# Patient Record
Sex: FEMALE | Race: WHITE | NOT HISPANIC OR LATINO | Employment: OTHER | URBAN - METROPOLITAN AREA
[De-identification: names, ages, dates, MRNs, and addresses within clinical notes are randomized per-mention and may not be internally consistent; named-entity substitution may affect disease eponyms.]

---

## 2024-03-03 ENCOUNTER — ANESTHESIA EVENT (OUTPATIENT)
Dept: SURGERY | Facility: CLINIC | Age: 89
End: 2024-03-03
Payer: MEDICARE

## 2024-03-03 ENCOUNTER — HOSPITAL ENCOUNTER (OUTPATIENT)
Facility: CLINIC | Age: 89
Discharge: HOME | End: 2024-03-03
Attending: EMERGENCY MEDICINE | Admitting: EMERGENCY MEDICINE
Payer: MEDICARE

## 2024-03-03 ENCOUNTER — ANESTHESIA (OUTPATIENT)
Dept: SURGERY | Facility: CLINIC | Age: 89
End: 2024-03-03
Payer: MEDICARE

## 2024-03-03 ENCOUNTER — HOSPITAL ENCOUNTER (OUTPATIENT)
Facility: CLINIC | Age: 89
End: 2024-03-03
Attending: OPHTHALMOLOGY | Admitting: OPHTHALMOLOGY
Payer: MEDICARE

## 2024-03-03 VITALS
HEART RATE: 57 BPM | RESPIRATION RATE: 12 BRPM | OXYGEN SATURATION: 95 % | DIASTOLIC BLOOD PRESSURE: 52 MMHG | SYSTOLIC BLOOD PRESSURE: 139 MMHG | WEIGHT: 175.93 LBS | TEMPERATURE: 98.2 F

## 2024-03-03 DIAGNOSIS — H44.001 RIGHT ENDOPHTHALMIA: Primary | ICD-10-CM

## 2024-03-03 DIAGNOSIS — H40.9 ACUTE GLAUCOMA OF RIGHT EYE: ICD-10-CM

## 2024-03-03 PROCEDURE — 258N000003 HC RX IP 258 OP 636: Performed by: NURSE ANESTHETIST, CERTIFIED REGISTERED

## 2024-03-03 PROCEDURE — 370N000017 HC ANESTHESIA TECHNICAL FEE, PER MIN: Performed by: OPHTHALMOLOGY

## 2024-03-03 PROCEDURE — 250N000013 HC RX MED GY IP 250 OP 250 PS 637: Performed by: ANESTHESIOLOGY

## 2024-03-03 PROCEDURE — 250N000009 HC RX 250: Performed by: OPHTHALMOLOGY

## 2024-03-03 PROCEDURE — 99100 ANES PT EXTEME AGE<1 YR&>70: CPT | Performed by: NURSE ANESTHETIST, CERTIFIED REGISTERED

## 2024-03-03 PROCEDURE — 360N000077 HC SURGERY LEVEL 4, PER MIN: Performed by: OPHTHALMOLOGY

## 2024-03-03 PROCEDURE — 99285 EMERGENCY DEPT VISIT HI MDM: CPT | Mod: 25 | Performed by: EMERGENCY MEDICINE

## 2024-03-03 PROCEDURE — 710N000012 HC RECOVERY PHASE 2, PER MINUTE: Performed by: OPHTHALMOLOGY

## 2024-03-03 PROCEDURE — 99100 ANES PT EXTEME AGE<1 YR&>70: CPT | Performed by: ANESTHESIOLOGY

## 2024-03-03 PROCEDURE — 67036 REMOVAL OF INNER EYE FLUID: CPT | Mod: RT | Performed by: OPHTHALMOLOGY

## 2024-03-03 PROCEDURE — 710N000010 HC RECOVERY PHASE 1, LEVEL 2, PER MIN: Performed by: OPHTHALMOLOGY

## 2024-03-03 PROCEDURE — 250N000013 HC RX MED GY IP 250 OP 250 PS 637: Performed by: EMERGENCY MEDICINE

## 2024-03-03 PROCEDURE — 250N000013 HC RX MED GY IP 250 OP 250 PS 637

## 2024-03-03 PROCEDURE — 99285 EMERGENCY DEPT VISIT HI MDM: CPT | Performed by: EMERGENCY MEDICINE

## 2024-03-03 PROCEDURE — 87205 SMEAR GRAM STAIN: CPT | Performed by: OPHTHALMOLOGY

## 2024-03-03 PROCEDURE — 67036 REMOVAL OF INNER EYE FLUID: CPT | Performed by: ANESTHESIOLOGY

## 2024-03-03 PROCEDURE — 87070 CULTURE OTHR SPECIMN AEROBIC: CPT | Performed by: OPHTHALMOLOGY

## 2024-03-03 PROCEDURE — 250N000011 HC RX IP 250 OP 636: Performed by: OPHTHALMOLOGY

## 2024-03-03 PROCEDURE — 999N000141 HC STATISTIC PRE-PROCEDURE NURSING ASSESSMENT: Performed by: OPHTHALMOLOGY

## 2024-03-03 PROCEDURE — 87186 SC STD MICRODIL/AGAR DIL: CPT | Performed by: OPHTHALMOLOGY

## 2024-03-03 PROCEDURE — 250N000011 HC RX IP 250 OP 636: Performed by: NURSE ANESTHETIST, CERTIFIED REGISTERED

## 2024-03-03 PROCEDURE — 250N000025 HC SEVOFLURANE, PER MIN: Performed by: OPHTHALMOLOGY

## 2024-03-03 PROCEDURE — 250N000009 HC RX 250: Performed by: NURSE ANESTHETIST, CERTIFIED REGISTERED

## 2024-03-03 PROCEDURE — 67036 REMOVAL OF INNER EYE FLUID: CPT | Performed by: NURSE ANESTHETIST, CERTIFIED REGISTERED

## 2024-03-03 PROCEDURE — 87102 FUNGUS ISOLATION CULTURE: CPT | Performed by: OPHTHALMOLOGY

## 2024-03-03 PROCEDURE — 272N000001 HC OR GENERAL SUPPLY STERILE: Performed by: OPHTHALMOLOGY

## 2024-03-03 PROCEDURE — 87075 CULTR BACTERIA EXCEPT BLOOD: CPT | Performed by: OPHTHALMOLOGY

## 2024-03-03 PROCEDURE — 87015 SPECIMEN INFECT AGNT CONCNTJ: CPT | Performed by: OPHTHALMOLOGY

## 2024-03-03 RX ORDER — ONDANSETRON 4 MG/1
4 TABLET, ORALLY DISINTEGRATING ORAL EVERY 30 MIN PRN
Status: DISCONTINUED | OUTPATIENT
Start: 2024-03-03 | End: 2024-03-03 | Stop reason: HOSPADM

## 2024-03-03 RX ORDER — FENTANYL CITRATE 50 UG/ML
25 INJECTION, SOLUTION INTRAMUSCULAR; INTRAVENOUS EVERY 5 MIN PRN
Status: DISCONTINUED | OUTPATIENT
Start: 2024-03-03 | End: 2024-03-03 | Stop reason: HOSPADM

## 2024-03-03 RX ORDER — CARVEDILOL 25 MG/1
25 TABLET ORAL 2 TIMES DAILY WITH MEALS
Status: DISCONTINUED | OUTPATIENT
Start: 2024-03-03 | End: 2024-03-03 | Stop reason: HOSPADM

## 2024-03-03 RX ORDER — SPIRONOLACTONE 25 MG/1
25 TABLET ORAL
Status: DISCONTINUED | OUTPATIENT
Start: 2024-03-03 | End: 2024-03-03 | Stop reason: HOSPADM

## 2024-03-03 RX ORDER — OXYCODONE HYDROCHLORIDE 5 MG/1
5 TABLET ORAL
Status: DISCONTINUED | OUTPATIENT
Start: 2024-03-03 | End: 2024-03-03 | Stop reason: HOSPADM

## 2024-03-03 RX ORDER — FERROUS SULFATE 325(65) MG
325 TABLET ORAL
COMMUNITY

## 2024-03-03 RX ORDER — DEXAMETHASONE SODIUM PHOSPHATE 4 MG/ML
INJECTION, SOLUTION INTRA-ARTICULAR; INTRALESIONAL; INTRAMUSCULAR; INTRAVENOUS; SOFT TISSUE PRN
Status: DISCONTINUED | OUTPATIENT
Start: 2024-03-03 | End: 2024-03-03

## 2024-03-03 RX ORDER — MONTELUKAST SODIUM 4 MG/1
1 TABLET, CHEWABLE ORAL 2 TIMES DAILY
COMMUNITY
Start: 2023-09-14

## 2024-03-03 RX ORDER — OFLOXACIN 3 MG/ML
1 SOLUTION/ DROPS OPHTHALMIC 4 TIMES DAILY
Qty: 5 ML | Refills: 0 | Status: SHIPPED | OUTPATIENT
Start: 2024-03-03 | End: 2024-03-19

## 2024-03-03 RX ORDER — MULTIVIT WITH MINERALS/LUTEIN
250 TABLET ORAL DAILY
COMMUNITY

## 2024-03-03 RX ORDER — ONDANSETRON 2 MG/ML
4 INJECTION INTRAMUSCULAR; INTRAVENOUS EVERY 30 MIN PRN
Status: DISCONTINUED | OUTPATIENT
Start: 2024-03-03 | End: 2024-03-03 | Stop reason: HOSPADM

## 2024-03-03 RX ORDER — TRIAMCINOLONE ACETONIDE 1 MG/G
OINTMENT TOPICAL 2 TIMES DAILY
COMMUNITY
Start: 2024-02-27

## 2024-03-03 RX ORDER — LOSARTAN POTASSIUM 25 MG/1
25 TABLET ORAL DAILY
Status: DISCONTINUED | OUTPATIENT
Start: 2024-03-03 | End: 2024-03-03 | Stop reason: HOSPADM

## 2024-03-03 RX ORDER — ONDANSETRON 2 MG/ML
INJECTION INTRAMUSCULAR; INTRAVENOUS PRN
Status: DISCONTINUED | OUTPATIENT
Start: 2024-03-03 | End: 2024-03-03

## 2024-03-03 RX ORDER — HYDROMORPHONE HYDROCHLORIDE 1 MG/ML
0.4 INJECTION, SOLUTION INTRAMUSCULAR; INTRAVENOUS; SUBCUTANEOUS EVERY 5 MIN PRN
Status: DISCONTINUED | OUTPATIENT
Start: 2024-03-03 | End: 2024-03-03 | Stop reason: HOSPADM

## 2024-03-03 RX ORDER — DEXAMETHASONE SODIUM PHOSPHATE 4 MG/ML
4 INJECTION, SOLUTION INTRA-ARTICULAR; INTRALESIONAL; INTRAMUSCULAR; INTRAVENOUS; SOFT TISSUE
Status: DISCONTINUED | OUTPATIENT
Start: 2024-03-03 | End: 2024-03-03 | Stop reason: HOSPADM

## 2024-03-03 RX ORDER — ACETAZOLAMIDE 250 MG/1
500 TABLET ORAL ONCE
Status: COMPLETED | OUTPATIENT
Start: 2024-03-03 | End: 2024-03-03

## 2024-03-03 RX ORDER — SODIUM CHLORIDE, SODIUM LACTATE, POTASSIUM CHLORIDE, CALCIUM CHLORIDE 600; 310; 30; 20 MG/100ML; MG/100ML; MG/100ML; MG/100ML
INJECTION, SOLUTION INTRAVENOUS CONTINUOUS
Status: DISCONTINUED | OUTPATIENT
Start: 2024-03-03 | End: 2024-03-03 | Stop reason: HOSPADM

## 2024-03-03 RX ORDER — ACETAZOLAMIDE 250 MG/1
500 TABLET ORAL ONCE
Qty: 2 TABLET | Refills: 0 | Status: COMPLETED | OUTPATIENT
Start: 2024-03-03 | End: 2024-03-03

## 2024-03-03 RX ORDER — SODIUM CHLORIDE, SODIUM LACTATE, POTASSIUM CHLORIDE, CALCIUM CHLORIDE 600; 310; 30; 20 MG/100ML; MG/100ML; MG/100ML; MG/100ML
INJECTION, SOLUTION INTRAVENOUS CONTINUOUS PRN
Status: DISCONTINUED | OUTPATIENT
Start: 2024-03-03 | End: 2024-03-03

## 2024-03-03 RX ORDER — NALOXONE HYDROCHLORIDE 0.4 MG/ML
0.1 INJECTION, SOLUTION INTRAMUSCULAR; INTRAVENOUS; SUBCUTANEOUS
Status: DISCONTINUED | OUTPATIENT
Start: 2024-03-03 | End: 2024-03-03 | Stop reason: HOSPADM

## 2024-03-03 RX ORDER — LOSARTAN POTASSIUM 25 MG/1
25 TABLET ORAL DAILY
COMMUNITY
Start: 2023-05-22

## 2024-03-03 RX ORDER — FENTANYL CITRATE 50 UG/ML
INJECTION, SOLUTION INTRAMUSCULAR; INTRAVENOUS PRN
Status: DISCONTINUED | OUTPATIENT
Start: 2024-03-03 | End: 2024-03-03

## 2024-03-03 RX ORDER — ACETAMINOPHEN 325 MG/1
325-650 TABLET ORAL EVERY 6 HOURS PRN
COMMUNITY

## 2024-03-03 RX ORDER — FENTANYL CITRATE 50 UG/ML
25 INJECTION, SOLUTION INTRAMUSCULAR; INTRAVENOUS
Status: DISCONTINUED | OUTPATIENT
Start: 2024-03-03 | End: 2024-03-03 | Stop reason: HOSPADM

## 2024-03-03 RX ORDER — OXYCODONE HYDROCHLORIDE 10 MG/1
10 TABLET ORAL
Status: DISCONTINUED | OUTPATIENT
Start: 2024-03-03 | End: 2024-03-03 | Stop reason: HOSPADM

## 2024-03-03 RX ORDER — VIT A/VIT C/VIT E/ZINC/COPPER 4296-226
1 CAPSULE ORAL 2 TIMES DAILY
COMMUNITY

## 2024-03-03 RX ORDER — VITAMIN E (DL,TOCOPHERYL ACET) 90 MG
200 CAPSULE ORAL 2 TIMES DAILY
COMMUNITY

## 2024-03-03 RX ORDER — OXYCODONE HYDROCHLORIDE 5 MG/1
5 TABLET ORAL EVERY 4 HOURS PRN
Status: DISCONTINUED | OUTPATIENT
Start: 2024-03-03 | End: 2024-03-03 | Stop reason: HOSPADM

## 2024-03-03 RX ORDER — BALANCED SALT SOLUTION 6.4; .75; .48; .3; 3.9; 1.7 MG/ML; MG/ML; MG/ML; MG/ML; MG/ML; MG/ML
SOLUTION OPHTHALMIC PRN
Status: DISCONTINUED | OUTPATIENT
Start: 2024-03-03 | End: 2024-03-03 | Stop reason: HOSPADM

## 2024-03-03 RX ORDER — ACETAMINOPHEN 325 MG/1
975 TABLET ORAL ONCE
Status: COMPLETED | OUTPATIENT
Start: 2024-03-03 | End: 2024-03-03

## 2024-03-03 RX ORDER — PREDNISOLONE ACETATE 10 MG/ML
1 SUSPENSION/ DROPS OPHTHALMIC 4 TIMES DAILY
Qty: 5 ML | Refills: 0 | Status: SHIPPED | OUTPATIENT
Start: 2024-03-03 | End: 2024-03-19

## 2024-03-03 RX ORDER — ATROPINE SULFATE 10 MG/ML
SOLUTION/ DROPS OPHTHALMIC PRN
Status: DISCONTINUED | OUTPATIENT
Start: 2024-03-03 | End: 2024-03-03 | Stop reason: HOSPADM

## 2024-03-03 RX ORDER — LIDOCAINE HYDROCHLORIDE 20 MG/ML
INJECTION, SOLUTION INFILTRATION; PERINEURAL PRN
Status: DISCONTINUED | OUTPATIENT
Start: 2024-03-03 | End: 2024-03-03

## 2024-03-03 RX ORDER — FENTANYL CITRATE 50 UG/ML
50 INJECTION, SOLUTION INTRAMUSCULAR; INTRAVENOUS EVERY 5 MIN PRN
Status: DISCONTINUED | OUTPATIENT
Start: 2024-03-03 | End: 2024-03-03 | Stop reason: HOSPADM

## 2024-03-03 RX ORDER — LIDOCAINE 40 MG/G
CREAM TOPICAL
Status: DISCONTINUED | OUTPATIENT
Start: 2024-03-03 | End: 2024-03-03 | Stop reason: HOSPADM

## 2024-03-03 RX ORDER — PROPOFOL 10 MG/ML
INJECTION, EMULSION INTRAVENOUS PRN
Status: DISCONTINUED | OUTPATIENT
Start: 2024-03-03 | End: 2024-03-03

## 2024-03-03 RX ORDER — HYDROMORPHONE HYDROCHLORIDE 1 MG/ML
0.2 INJECTION, SOLUTION INTRAMUSCULAR; INTRAVENOUS; SUBCUTANEOUS EVERY 5 MIN PRN
Status: DISCONTINUED | OUTPATIENT
Start: 2024-03-03 | End: 2024-03-03 | Stop reason: HOSPADM

## 2024-03-03 RX ORDER — CARVEDILOL 25 MG/1
25 TABLET ORAL 2 TIMES DAILY WITH MEALS
COMMUNITY
Start: 2023-05-22

## 2024-03-03 RX ORDER — SPIRONOLACTONE 25 MG/1
25 TABLET ORAL 2 TIMES DAILY
COMMUNITY
Start: 2023-05-22

## 2024-03-03 RX ADMIN — ACETAZOLAMIDE 500 MG: 250 TABLET ORAL at 06:22

## 2024-03-03 RX ADMIN — FENTANYL CITRATE 25 MCG: 50 INJECTION INTRAMUSCULAR; INTRAVENOUS at 13:35

## 2024-03-03 RX ADMIN — Medication 50 MG: at 12:32

## 2024-03-03 RX ADMIN — ACETAMINOPHEN 975 MG: 325 TABLET, FILM COATED ORAL at 11:46

## 2024-03-03 RX ADMIN — PHENYLEPHRINE HYDROCHLORIDE 100 MCG: 10 INJECTION INTRAVENOUS at 12:54

## 2024-03-03 RX ADMIN — PROPOFOL 50 MG: 10 INJECTION, EMULSION INTRAVENOUS at 12:34

## 2024-03-03 RX ADMIN — DEXAMETHASONE SODIUM PHOSPHATE 4 MG: 4 INJECTION, SOLUTION INTRA-ARTICULAR; INTRALESIONAL; INTRAMUSCULAR; INTRAVENOUS; SOFT TISSUE at 12:57

## 2024-03-03 RX ADMIN — OXYCODONE HYDROCHLORIDE 5 MG: 5 TABLET ORAL at 09:09

## 2024-03-03 RX ADMIN — SODIUM CHLORIDE, POTASSIUM CHLORIDE, SODIUM LACTATE AND CALCIUM CHLORIDE: 600; 310; 30; 20 INJECTION, SOLUTION INTRAVENOUS at 12:32

## 2024-03-03 RX ADMIN — ACETAZOLAMIDE 500 MG: 250 TABLET ORAL at 08:36

## 2024-03-03 RX ADMIN — FENTANYL CITRATE 25 MCG: 50 INJECTION INTRAMUSCULAR; INTRAVENOUS at 13:51

## 2024-03-03 RX ADMIN — ONDANSETRON 4 MG: 2 INJECTION INTRAMUSCULAR; INTRAVENOUS at 14:17

## 2024-03-03 RX ADMIN — PHENYLEPHRINE HYDROCHLORIDE 0.5 MCG/KG/MIN: 10 INJECTION INTRAVENOUS at 12:47

## 2024-03-03 RX ADMIN — SUGAMMADEX 200 MG: 100 INJECTION, SOLUTION INTRAVENOUS at 14:17

## 2024-03-03 RX ADMIN — PHENYLEPHRINE HYDROCHLORIDE 100 MCG: 10 INJECTION INTRAVENOUS at 12:40

## 2024-03-03 RX ADMIN — LIDOCAINE HYDROCHLORIDE 80 MG: 20 INJECTION, SOLUTION INFILTRATION; PERINEURAL at 12:32

## 2024-03-03 RX ADMIN — FENTANYL CITRATE 50 MCG: 50 INJECTION INTRAMUSCULAR; INTRAVENOUS at 12:32

## 2024-03-03 RX ADMIN — PROPOFOL 100 MG: 10 INJECTION, EMULSION INTRAVENOUS at 12:32

## 2024-03-03 RX ADMIN — SPIRONOLACTONE 25 MG: 25 TABLET ORAL at 07:48

## 2024-03-03 RX ADMIN — CARVEDILOL 25 MG: 25 TABLET, FILM COATED ORAL at 07:49

## 2024-03-03 RX ADMIN — LOSARTAN POTASSIUM 25 MG: 25 TABLET, FILM COATED ORAL at 07:48

## 2024-03-03 ASSESSMENT — ENCOUNTER SYMPTOMS: DYSRHYTHMIAS: 1

## 2024-03-03 ASSESSMENT — COLUMBIA-SUICIDE SEVERITY RATING SCALE - C-SSRS
2. HAVE YOU ACTUALLY HAD ANY THOUGHTS OF KILLING YOURSELF IN THE PAST MONTH?: NO
6. HAVE YOU EVER DONE ANYTHING, STARTED TO DO ANYTHING, OR PREPARED TO DO ANYTHING TO END YOUR LIFE?: NO
1. IN THE PAST MONTH, HAVE YOU WISHED YOU WERE DEAD OR WISHED YOU COULD GO TO SLEEP AND NOT WAKE UP?: NO

## 2024-03-03 ASSESSMENT — ACTIVITIES OF DAILY LIVING (ADL)
ADLS_ACUITY_SCORE: 35

## 2024-03-03 NOTE — ANESTHESIA POSTPROCEDURE EVALUATION
Patient: Janna Mejía    Procedure: Procedure(s):  Vitrectomy parsplana with 25 gauge system; intravitreal injection; anterior chamber washout; membrane peel  Intravitreal injection gas/tPA/methotrexate/antibiotics       Anesthesia Type:  General    Note:  Disposition: Outpatient   Postop Pain Control: Uneventful            Sign Out: Well controlled pain   PONV: No   Neuro/Psych: Uneventful            Sign Out: Acceptable/Baseline neuro status   Airway/Respiratory: Uneventful            Sign Out: Acceptable/Baseline resp. status   CV/Hemodynamics: Uneventful            Sign Out: Acceptable CV status; No obvious hypovolemia; No obvious fluid overload   Other NRE: NONE   DID A NON-ROUTINE EVENT OCCUR?            Last vitals:  Vitals Value Taken Time   /57 03/03/24 1445   Temp 37  C (98.6  F) 03/03/24 1434   Pulse 59 03/03/24 1449   Resp 0 03/03/24 1449   SpO2 95 % 03/03/24 1449   Vitals shown include unfiled device data.    Electronically Signed By: Christoph Gresham MD  March 3, 2024  3:06 PM

## 2024-03-03 NOTE — DISCHARGE INSTRUCTIONS

## 2024-03-03 NOTE — MEDICATION SCRIBE - ADMISSION MEDICATION HISTORY
Medication Scribe Admission Medication History    Admission medication history is complete. The information provided in this note is only as accurate as the sources available at the time of the update.    Information Source(s): Patient, Hospital records, and CareEverywhere/SureScripts via in-person    Pertinent Information: None.    Changes made to PTA medication list:  Added: acetaminophen 650 mg Q 6 Hrs PRN, carvedilol 25 mg BID, colestipol 1 gm BID, Ferrous sulfate 325 mg daily, losartan 25 mg daily, Preservision AREDS 1 capsule BID, Spironolactone 25 mg BID, Triamcinolone 0.1% BID, Vitamin C 250 mg daily, Vitamin E 90 mg BID.  Deleted: None  Changed: None    Allergies reviewed with patient and updates made in EHR: yes    Medication History Completed By: Sammy Herr MD 3/3/2024 7:57 AM    PTA Med List   Medication Sig Last Dose    acetaminophen (TYLENOL) 325 MG tablet Take 325-650 mg by mouth every 6 hours as needed for mild pain 3/2/2024 at am    carvedilol (COREG) 25 MG tablet Take 25 mg by mouth 2 times daily (with meals) 3/2/2024 at pm    colestipol (COLESTID) 1 g tablet Take 1 g by mouth 2 times daily 3/2/2024 at pm    ferrous sulfate (FEROSUL) 325 (65 Fe) MG tablet Take 325 mg by mouth daily (with breakfast) 3/2/2024 at pm    losartan (COZAAR) 25 MG tablet Take 25 mg by mouth daily 3/2/2024 at am    Multiple Vitamins-Minerals (PRESERVISION AREDS) CAPS Take 1 capsule by mouth 2 times daily 3/2/2024 at pm    spironolactone (ALDACTONE) 25 MG tablet Take 25 mg by mouth 2 times daily 3/2/2024 at pm    triamcinolone (KENALOG) 0.1 % external ointment Apply topically 2 times daily 3/2/2024 at pm    vitamin C 250 MG tablet Take 250 mg by mouth daily Past Week at unknown    Vitamin E 90 MG (200 UNIT) capsule Take 200 Units by mouth 2 times daily 3/2/2024 at pm

## 2024-03-03 NOTE — BRIEF OP NOTE
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    Procedure: Vitrectomy parsplana with 25 gauge system; intravitreal injection; anterior chamber washout; membrane peel, Right - Eye  Intravitreal injection gas/tPA/methotrexate/antibiotics, Right - Eye    Surgeon: Surgeon(s) and Role:     * Tomás Goins MD - Primary     * Jonathan Syed MD - Fellow - Assisting  Anesthesia: General   Estimated Blood Loss: Minimal    Drains: None  Specimens:   ID Type Source Tests Collected by Time Destination   A : right eye vitreous fluid culture Vitreous Fluid Eye, Right ANAEROBIC BACTERIAL CULTURE ROUTINE, GRAM STAIN, FUNGAL OR YEAST CULTURE ROUTINE, AEROBIC BACTERIAL CULTURE ROUTINE Tomás Goins MD 3/3/2024  1:08 PM    B : diluted right vitreous fluid culture Vitreous Fluid Eye, Right ANAEROBIC BACTERIAL CULTURE ROUTINE, GRAM STAIN, FUNGAL OR YEAST CULTURE ROUTINE, AEROBIC BACTERIAL CULTURE ROUTINE Tomás Goins MD 3/3/2024  2:20 PM      Findings:   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  Complications: None.  Implants: * No implants in log *

## 2024-03-03 NOTE — ANESTHESIA PREPROCEDURE EVALUATION
"Anesthesia Pre-Procedure Evaluation    Patient: Janna Mejía   MRN: [id number] : 10/8/1932        Procedure : Procedure(s):  Vitrectomy parsplana with 25 gauge system, possible gas versus oil. possible endolaser  Intravitreal injection gas/tPA/methotrexate/antibiotics          History reviewed. No pertinent past medical history.   History reviewed. No pertinent surgical history.   No Known Allergies   Social History     Tobacco Use    Smoking status: Former     Types: Cigarettes    Smokeless tobacco: Never   Substance Use Topics    Alcohol use: Not on file      Wt Readings from Last 1 Encounters:   No data found for Wt        Anesthesia Evaluation            ROS/MED HX  ENT/Pulmonary:       Neurologic:       Cardiovascular:     (+)  - -   -  - -                        dysrhythmias, 1st Deg Heart Block,             METS/Exercise Tolerance:     Hematologic:     (+)      anemia,          Musculoskeletal: Comment: Lumbago      GI/Hepatic: Comment: Chronic pancreatitis    (+) GERD,                   Renal/Genitourinary:       Endo:     (+)          thyroid problem, hyperthyroidism,           Psychiatric/Substance Use:       Infectious Disease:       Malignancy:   (+) Malignancy, History of GI.GI CA  Remission status post.      Other:            Physical Exam    Airway  airway exam normal      Mallampati: II       Respiratory Devices and Support         Dental       (+) Minor Abnormalities - some fillings, tiny chips      Cardiovascular   cardiovascular exam normal          Pulmonary   pulmonary exam normal                OUTSIDE LABS:  CBC: No results found for: \"WBC\", \"HGB\", \"HCT\", \"PLT\"  BMP: No results found for: \"NA\", \"POTASSIUM\", \"CHLORIDE\", \"CO2\", \"BUN\", \"CR\", \"GLC\"  COAGS: No results found for: \"PTT\", \"INR\", \"FIBR\"  POC: No results found for: \"BGM\", \"HCG\", \"HCGS\"  HEPATIC: No results found for: \"ALBUMIN\", \"PROTTOTAL\", \"ALT\", \"AST\", \"GGT\", \"ALKPHOS\", \"BILITOTAL\", \"BILIDIRECT\", \"PAULINE\"  OTHER: No " "results found for: \"PH\", \"LACT\", \"A1C\", \"JANUSZ\", \"PHOS\", \"MAG\", \"LIPASE\", \"AMYLASE\", \"TSH\", \"T4\", \"T3\", \"CRP\", \"SED\"    Anesthesia Plan    ASA Status:  2    NPO Status:  NPO Appropriate    Anesthesia Type: General.     - Airway: ETT   Induction: Intravenous, Propofol.   Maintenance: Balanced.        Consents    Anesthesia Plan(s) and associated risks, benefits, and realistic alternatives discussed. Questions answered and patient/representative(s) expressed understanding.     - Discussed: Risks, Benefits and Alternatives for the PROCEDURE were discussed     - Discussed with:  Patient            Postoperative Care    Pain management: IV analgesics, Oral pain medications, Multi-modal analgesia.   PONV prophylaxis: Ondansetron (or other 5HT-3), Dexamethasone or Solumedrol, Background Propofol Infusion     Comments:               Christoph Gresham MD    I have reviewed the pertinent notes and labs in the chart from the past 30 days and (re)examined the patient.  Any updates or changes from those notes are reflected in this note.                  "

## 2024-03-03 NOTE — CONSULTS
"  OPHTHALMOLOGY CONSULT NOTE  03/03/24    Patient: Janna Mejía        ASSESSMENT/PLAN:     Janna Mejía is a 89 year old female who presented with     #C/f exogenous endophthalmitis, right eye  #Acute Angle Closure Glaucoma Right eye  Presents with painful, unilateral vision loss in conjunction with headache, nausea and IOP of 47 in right eye. The patient received an injection for her AMD in the right eye on 03/01 and has had worsening pain since. Does not know what drug is injected. States that she has had her cataracts removed in both eyes about five years ago.   No family history of angle closure glaucoma.  No precipitating events: dim illumination, receiving dilating drops, retinal problem, recent laser treatment/surgery, medications (anticholinergics, topiramate, sulfa, steroids)  Negative Luiz sign.  Visual Acuity: Reduced from baseline \"able to read newspaper print\" to LP  Slit lamp exam showed: significant AC cell and flare, fibrin collections scattered throughout periphery, shallow chamber without IK touch, fixed miotic pupil  Gonioscopy showed: unable due to K edema and angle obscuration due to fibrin  Fundus exam showed: unable due to fixed < 1 mm pupil  B scan showing some intraretinal/subretinal fluid but no rere detachments, mild-moderate vitreous debris most likely age-related as it is anterior to hyaloid and clear fluid posterior to this demarcation line.    Based on the findings above, this is likely either a sterile inflammation associated with an acute posterior pushing mechanism due to the posterior iris synechiae/NVG in the context of the patient's AMD; or this is an infectious endophthalmitis resulting in inflammatory-associated glaucoma and likely with a component of trabeculitis or outflow obstruction due to fibrin in the iridocorneal angle. Considering anterior chamber tap and injection of intravitreal antibiotics versus vitrectomy of the left eye at the time of " this writing.     ED Interventions at OSH  -Timolol 0.5% 1 drop affeced eye x multiple rounds  -Analgesia and antiemetics PRN per primary team  Supine positioning in lighted room with hourly IOP checks    Given multiple rounds of the following without adequate reduction in IOP  -Timolol 0.5% 1 drop affected eye BID   -Brimonidine 0.1% 1 drop affected eye BID   -Dorzolamide 2% 1 drop affected eye BID  -Latanoprost 0.005% 1 drop affected eye daily    Given acetazolamide 500 mg immediate release tabs x 2 without adequate reduction in IOP    Difficult to tell if this is an infection versus inflammatory response. 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     Plan:   -Staffed with retina service and considering anterior chamber tap and injection of intravitreal antibiotics versus vitrectomy with synechiolysis of the left eye at the time of this writing.   -Continue max antitensive therapy:   -Timolol 0.5% 1 drop affected eye BID   -Brimonidine 0.1% 1 drop affected eye BID   -Dorzolamide 2% 1 drop affected eye BID  -Latanoprost 0.005% 1 drop affected eye daily  -Diamox sequels 500mg BID  -NPO status (sips with meds ok)  -Pain management per ER, would recommend to start with oxy 5 mg q4h as needed for severe pain and increase as needed without causing sedation/respiratory drive suppression in this elderly patient  -Tube shunt/trabeculectomy may still be required if the patient has an insufficient response to the above    It is our pleasure to participate in this patient's care and treatment. Please contact us with any further questions or concerns.    Discussed with Dr. Hurley, Dr. Moreno, and Dr. De La Garza who agreed with this assessment and plan.     Ophthalmology will continue to follow closely. Please contact ophthalmologist on call with any questions or concerns. We appreciate your care of this  patient.    Romulo Erickson MD  PGY-2, Ophthalmology  [facility]  03/03/24    HISTORY OF PRESENTING ILLNESS:   Janna Mejía is a 89 Y female, with a history of hypertension, macular degeneration, and cataracts who presents to the emergency department for evaluation of eye pain and hypertension. The patient reports that she received an eye injection around 13:00 on 3/1 for macular degeneration. Around 12:00 yesterday, she began to develop pain and swelling in her right eye. The pain has progressively worsened throughout the day, and her vision has become more blurry. The patient states that the pain seems to be inside her eye. Upon exam at 00:30, interocular pressure in her left eye was 14, and interocular pressure in her right eye was 48. She also notes that her hypertension is controlled with medications. She denies recent falls, injuries, trauma, or any other medical concerns at this time.    10+ review of systems were otherwise negative except for that which has been stated above.      OCULAR/MEDICAL/SURGICAL HISTORIES:     Past Ocular History:   Last eye exam: 3/1/24   Previously diagnosed ocular conditions: AMD  Prior eye surgery/laser: CE IOL OU  Contact lens wear: none  Glasses: wears  Eyedrops: none    Pertinent Systemic Medications:   Current Facility-Administered Medications   Medication    carvedilol (COREG) tablet 25 mg    cefTAZidime (FORTAZ) 4.5 mg/0.1 mL injection (PF) 4.5 mg    losartan (COZAAR) tablet 25 mg    oxyCODONE (ROXICODONE) tablet 5 mg    spironolactone (ALDACTONE) tablet 25 mg    vancomycin (VANCOCIN) 2 mg/0.1 mL intraocular injection (PF) 2 mg     Current Outpatient Medications   Medication    acetaminophen (TYLENOL) 325 MG tablet    carvedilol (COREG) 25 MG tablet    colestipol (COLESTID) 1 g tablet    ferrous sulfate (FEROSUL) 325 (65 Fe) MG tablet    losartan (COZAAR) 25 MG tablet    Multiple Vitamins-Minerals (PRESERVISION AREDS) CAPS    spironolactone (ALDACTONE)  25 MG tablet    triamcinolone (KENALOG) 0.1 % external ointment    vitamin C 250 MG tablet    Vitamin E 90 MG (200 UNIT) capsule     Past Medical History:  History reviewed. No pertinent past medical history.    Past Surgical History:   History reviewed. No pertinent surgical history.    Family History:   No history of macular degeneration or glaucoma    Social History:   No tobacco use    EXAMINATION:     Base Eye Exam       Visual Acuity (Snellen - Linear)         Right Left    Near sc  20/100 ph NI    Near cc LP               Tonometry (Tonopen, 5:46 AM)         Right Left    Pressure 39 14              Pupils         APD    Right Yes    Left None              Extraocular Movement         Right Left     Full, Ortho Full, Ortho                  Slit Lamp and Fundus Exam       External Exam         Right Left    External Normal Normal              Slit Lamp Exam         Right Left    Lids/Lashes Normal Normal    Conjunctiva/Sclera 2-3+ injection 360 White and quiet    Cornea K edema Clear    Anterior Chamber Shallow chamber, 4+ Cell and flare, fibrin in clumps and strands throughout, particularly at periphery Deep and quiet    Iris Small, fixed < 1 mm Round and reactive    Lens ?pupillary membrane vs cell deposition; no view through pupil Clear    Anterior Vitreous unable Normal                    Labs/Studies/Imaging Performed  None     Romulo Erickson MD  Resident Physician, PGY2  Department of Ophthalmology  03/03/24 5:34 AM

## 2024-03-03 NOTE — ANESTHESIA CARE TRANSFER NOTE
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    Anesthesia Type:   General     Note:    Oropharynx: oropharynx clear of all foreign objects and spontaneously breathing  Level of Consciousness: awake and drowsy  Oxygen Supplementation: face mask  Level of Supplemental Oxygen (L/min / FiO2): 6  Independent Airway: airway patency satisfactory and stable  Dentition: dentition unchanged  Vital Signs Stable: post-procedure vital signs reviewed and stable  Report to RN Given: handoff report given  Patient transferred to: PACU    Handoff Report: Identifed the Patient, Identified the Reponsible Provider, Reviewed the pertinent medical history, Discussed the surgical course, Reviewed Intra-OP anesthesia mangement and issues during anesthesia, Set expectations for post-procedure period and Allowed opportunity for questions and acknowledgement of understanding      Vitals:  Vitals Value Taken Time   /65    Temp 37    Pulse 57    Resp 9    SpO2 99 % 03/03/24 1435   Vitals shown include unfiled device data.    Electronically Signed By: ALONZO BONDS APRN CRNA  March 3, 2024  2:36 PM

## 2024-03-03 NOTE — ED TRIAGE NOTES
Triage Assessment (Adult)       Row Name 03/03/24 0512          Triage Assessment    Airway WDL WDL        Respiratory WDL    Respiratory WDL WDL        Skin Circulation/Temperature WDL    Skin Circulation/Temperature WDL WDL        Cardiac WDL    Cardiac WDL X  HTN        Peripheral/Neurovascular WDL    Peripheral Neurovascular WDL WDL        Cognitive/Neuro/Behavioral WDL    Cognitive/Neuro/Behavioral WDL WDL

## 2024-03-03 NOTE — ED PROVIDER NOTES
History     Chief Complaint   Patient presents with    Eye Problem     Transfer from [place],  eye pain and pressure with blurry vision since yesterday morning. Has has injections for macular degeneration with last one this Friday. Pain managed on arrival.      HPI  Janna Mejía is a 89 year old female with PMH notable for macular degeneration, cataracts, hypertension  who presents to the ED with eye pain.  Patient sent as transfer from [place] ED due to concern for acute angle-closure glaucoma with elevated ocular pressures.  Patient had received an eye injection around 1 PM Friday (1.5 days ago) for macular degeneration.  Patient then had sudden onset of pain and swelling around the right eye noon yesterday.  The ED provider in [place] noted right eye pressure of 48, left 14.  Multiple treatments were started and patient transferred here for ophthalmology consult and cares.     Physical Exam   BP: (!) 195/70  Pulse: 51  Temp: 98.2  F (36.8  C)  Resp: 16  SpO2: 97 %    Physical Exam  General: Uncomfortable. Appears stated age.   HENT: MMM, no oropharyngeal lesions  Eyes: R eye pupil small and not dilating, injected sclerae. Left eye pupil reactive and clear sclerae  Cardio:  borderline bradycardic rate. Regular rhythm. Extremities well perfused  Resp: Normal work of breathing, Normal respiratory rate.   Neuro: alert without signs of confusion. CN II-XII grossly intact. Grossly normal strength and sensation in all extremities.   Psych: normal affect, normal behavior      ED Course      Procedures              Labs Ordered and Resulted from Time of ED Arrival to Time of ED Departure - No data to display  No orders to display        Medical Decision Making  The patient's presentation was of high complexity (an acute health issue posing potential threat to life or bodily function).    The patient's evaluation involved:  review of external note(s) from 1 sources ([facility] ED)  review of 3+ test  result(s) ordered prior to this encounter (labs from [facility] ED)  discussion of management or test interpretation with another health professional (ophtho)    The patient's management necessitated moderate risk (prescription drug management including medications given in the ED), high risk (a decision regarding hospitalization), and further care after sign-out to Dr. Garza (see their note for further management).      Assessments & Plan   Patient presenting with right eye pain and vision change about 1 day after intraocular injection. Vitals in the ED with elevated blood pressure. Nursing notes reviewed.  [facility] notes reviewed.  Patient received multiple medications to decrease orbital pressure, did have decreased from upper 40s to upper 20s with interventions.  Patient also was very hypertensive, received labetalol x 2.  CBC and chemistry panel were unremarkable.    Ophthalmology consulted upon patient arrival and promptly evaluated the patient.  See note by Dr. Erickson for details on their evaluation and treatment.  He reported high likelihood of need for antibiotic injections, likely admission.  Final plan still pending staffing with ophtho attending.    BP elevated while in the ED.  Patient's home antihypertensive medications were ordered.  Patient signed out to oncoming ED provider with plan to follow-up final ophthalmology recommendations, likely admission.    Final diagnoses:   Acute glaucoma of right eye     New Prescriptions    No medications on file     --  Omar Crandall MD   Emergency Medicine   [facility] EMERGENCY DEPARTMENT  3/3/2024       Omar Crandall MD  03/03/24 [id number]

## 2024-03-04 ENCOUNTER — TELEPHONE (OUTPATIENT)
Dept: OPHTHALMOLOGY | Facility: CLINIC | Age: 89
End: 2024-03-04

## 2024-03-04 ENCOUNTER — OFFICE VISIT (OUTPATIENT)
Dept: OPHTHALMOLOGY | Facility: CLINIC | Age: 89
End: 2024-03-04
Attending: OPHTHALMOLOGY
Payer: MEDICARE

## 2024-03-04 DIAGNOSIS — Z48.810 AFTERCARE FOLLOWING SURGERY OF A SENSE ORGAN: Primary | ICD-10-CM

## 2024-03-04 DIAGNOSIS — H43.391 VITREOUS OPACITIES OF RIGHT EYE: ICD-10-CM

## 2024-03-04 DIAGNOSIS — H18.20 CORNEAL EDEMA OF RIGHT EYE: ICD-10-CM

## 2024-03-04 DIAGNOSIS — H44.001 ACUTE ENDOPHTHALMITIS, RIGHT: ICD-10-CM

## 2024-03-04 PROCEDURE — 76512 OPH US DX B-SCAN: CPT | Performed by: OPHTHALMOLOGY

## 2024-03-04 PROCEDURE — G0463 HOSPITAL OUTPT CLINIC VISIT: HCPCS | Performed by: OPHTHALMOLOGY

## 2024-03-04 PROCEDURE — 99024 POSTOP FOLLOW-UP VISIT: CPT | Performed by: OPHTHALMOLOGY

## 2024-03-04 PROCEDURE — 99207 ULTRASOUND B-SCAN OD (RIGHT EYE): CPT | Mod: 26 | Performed by: OPHTHALMOLOGY

## 2024-03-04 ASSESSMENT — CONF VISUAL FIELD
OS_SUPERIOR_TEMPORAL_RESTRICTION: 0
OD_INFERIOR_NASAL_RESTRICTION: 1
OD_INFERIOR_TEMPORAL_RESTRICTION: 1
OS_NORMAL: 1
OS_SUPERIOR_NASAL_RESTRICTION: 0
OD_SUPERIOR_TEMPORAL_RESTRICTION: 1
OD_SUPERIOR_NASAL_RESTRICTION: 1
OS_INFERIOR_TEMPORAL_RESTRICTION: 0
OS_INFERIOR_NASAL_RESTRICTION: 0

## 2024-03-04 ASSESSMENT — TONOMETRY
IOP_METHOD: ICARE
OS_IOP_MMHG: 05
OD_IOP_MMHG: 05

## 2024-03-04 ASSESSMENT — VISUAL ACUITY
OD_SC: HM
OS_SC: 20/100
METHOD: SNELLEN - LINEAR

## 2024-03-04 ASSESSMENT — SLIT LAMP EXAM - LIDS: COMMENTS: NORMAL

## 2024-03-04 ASSESSMENT — EXTERNAL EXAM - RIGHT EYE: OD_EXAM: NORMAL

## 2024-03-04 NOTE — TELEPHONE ENCOUNTER
Infectious disease Lab report:    Vitreous fluid from Right eye on 03/03/2024  Collected at 2:20PM  Aerobic bacteria culture grew 3+ Streptococcus mitis/oralis     Vitreous fluid from Right eye on 03/03/2024  Collected at 1:08PM   Aerobic bacteria culture  Grew 4+ Streptococcus mitis/oralis

## 2024-03-04 NOTE — PROGRESS NOTES
CC -   Post Op    INTERVAL HISTORY - Initial visit with me  POD #1 OD s/p PPV/vit Bx/ABx for endophthalmitis and secondary aqueous misdirection after IVT injection OU on Friday 3/1/24 (?Eylea)    Vit Bx OD 3/3/24  Gram stain - 3+ g+ cocci, 3+ g- coccobacilli, 3+ WBC  Aerobic Cx - pending (3/4/24)  Anaerobic Cx - pending (3/4/24)  Fungal Cx - pending (3/4/24)        [facility]   [facility] Cris Mejía is a  89 year old year-old patient with history of aqueous misdirection & endophthalmitis OD diagnosed 3/3/24, had high IOP, shallow chamber, and severe inflammation OD 3/3/24 after injection (?Eylea) OU by Dr. Espinoza 3/1/24        PAST OCULAR SURGERY  CE/IOL OU ~ 2018  PPV/Vit Bx/ABx OD 3/3/24      RETINAL IMAGING:  U/S B-scan 03/04/24  OD - choroid & eye wall thickening, minimal vitritis, no RD/CD/mass      ASSESSMENT & PLAN    # Endophthalmitis OD   - Dx 3/3/24 after injection (?Eylea) 3/1/24 by Alexis   - s/p PPV/ABx 3/3/24     - Gram stain G+ & G- organisms   - cultures pending   - monitor closely   - f/u tomorrow with Alexis        #  H/o aqueous misdirection OD   - likely secondary to endophthalmitis   - s/p PPV 3/3/24   - IOP good today, chamber deep      # Wet AMD OU   - injected with Eylea by Dr. Espinoza   - last njection OU 3/1/24 per patient   - per patient on 7 week interval      # Syneresis OS      # Pseudophakia OU      Return in about 1 day (around 3/5/2024) for US OD. with Jigna        ATTESTATION     Attending Attestation:     Complete documentation of historical and exam elements from today's encounter can be found in the full encounter summary report (not reduplicated in this progress note).  I personally obtained the chief complaint(s) and history of present illness.  I confirmed and edited as necessary the review of systems, past medical/surgical history, family history, social history, and examination findings as documented by others; and I examined the patient myself.  I personally reviewed  the relevant tests, images, and reports as documented above.  I formulated and edited as necessary the assessment and plan and discussed the findings and management plan with the patient and family    Hallie Isabel MD, PhD  , Vitreoretinal Surgery  Department of Ophthalmology  [facility]

## 2024-03-04 NOTE — NURSING NOTE
Chief Complaints and History of Present Illnesses   Patient presents with    Post Op (Ophthalmology) Right Eye     POD#1 s/p 25G PPV, anterior chamber washout; membrane peel, with intravitreal antibiotics RE 03/03/2024           Chief Complaint(s) and History of Present Illness(es)       Post Op (Ophthalmology) Right Eye              Laterality: right eye    Comments: POD#1 s/p 25G PPV, anterior chamber washout; membrane peel, with intravitreal antibiotics RE 03/03/2024                    Comments    Pt slept well last night. No eye pain yesterday or today, just tenderness in RE.  No DM.    SUJIT Ortiz March 4, 2024 9:06 AM

## 2024-03-04 NOTE — PATIENT INSTRUCTIONS
POST-OPERATIVE INSTRUCTIONS FOLLOWING RETINA SURGERY    Halile Isabel MD, PhD  Department of Ophthalmology  [facility]  [phone]      ACTIVITY:  No heavy lifting for 2 weeks after surgery.  No swimming for 3 weeks after surgery.  It is OK for you to shower, to wash your face, and to wash your hair.  Allow the shower to hit the top of your head and wash down your face.  Do not hit your eye directly with the jet from the shower.  Keep your eye covered with the shield when you sleep for 1 week after surgery.  If your shield has a tab, it is designed to go over the bridge of your nose.  Place one piece of tape diagonally from the center of your forehead to the side of your cheek to secure the shield.   During the daytime, you can use either the shield or your regular eyeglasses or sunglasses to protect your eye.      EYE DROPS  Use these drops after surgery.  When using more than one drop, separate them by 3 minutes between drops.  Common times to place drops are breakfast, lunch, dinner, and bedtime.  Do not stop your drops without discussing with our office.  If you run out before your appointment, call and we will send in a refill.      Ofloxacin --- 4 times per day  Pred Forte (prednisolone acetate) ---every 2 hours while awake    WHAT TO EXPECT  It is common for the eye to to have a blood tinged discharge for a few days after surgery  It may feel irritated (as if something were in your eye), for there to be clear discharge (thicker in the mornings upon awakening), and for it to be bloodshot for 2-3 weeks following retina surgery.           WHAT TO WATCH OUT FOR  If you experience any of the following, you should call immediately:  Increasing pain  Increasing nausea or vomiting  Increasing redness  Worsening or darkening of the vision  New flashing lights or floaters      For any of the symptoms listed above, or for other concerns, call [phone] and ask to speak to the clinic nurse.  If  you call after hours, follow to prompts to reach the doctor on call.

## 2024-03-05 ENCOUNTER — OFFICE VISIT (OUTPATIENT)
Dept: OPHTHALMOLOGY | Facility: CLINIC | Age: 89
End: 2024-03-05
Attending: OPHTHALMOLOGY
Payer: MEDICARE

## 2024-03-05 ENCOUNTER — TELEPHONE (OUTPATIENT)
Dept: OPHTHALMOLOGY | Facility: CLINIC | Age: 89
End: 2024-03-05
Payer: MEDICARE

## 2024-03-05 DIAGNOSIS — Z48.810 AFTERCARE FOLLOWING SURGERY OF A SENSE ORGAN: ICD-10-CM

## 2024-03-05 DIAGNOSIS — H44.001 ACUTE ENDOPHTHALMITIS, RIGHT: Primary | ICD-10-CM

## 2024-03-05 LAB
GRAM STAIN RESULT: ABNORMAL

## 2024-03-05 PROCEDURE — 67028 INJECTION EYE DRUG: CPT | Mod: RT | Performed by: OPHTHALMOLOGY

## 2024-03-05 PROCEDURE — 99207 ULTRASOUND B-SCAN OD (RIGHT EYE): CPT | Mod: 26 | Performed by: OPHTHALMOLOGY

## 2024-03-05 PROCEDURE — 76512 OPH US DX B-SCAN: CPT | Performed by: OPHTHALMOLOGY

## 2024-03-05 PROCEDURE — G0463 HOSPITAL OUTPT CLINIC VISIT: HCPCS | Mod: 25 | Performed by: OPHTHALMOLOGY

## 2024-03-05 PROCEDURE — 250N000011 HC RX IP 250 OP 636: Performed by: OPHTHALMOLOGY

## 2024-03-05 PROCEDURE — 99024 POSTOP FOLLOW-UP VISIT: CPT | Mod: 25 | Performed by: OPHTHALMOLOGY

## 2024-03-05 RX ADMIN — LIDOCAINE HYDROCHLORIDE 0.5 ML: 20 INJECTION, SOLUTION EPIDURAL; INFILTRATION; INTRACAUDAL; PERINEURAL at 12:36

## 2024-03-05 RX ADMIN — VANCOMYCIN HYDROCHLORIDE 2 MG: 1 INJECTION, POWDER, LYOPHILIZED, FOR SOLUTION INTRAVENOUS at 12:31

## 2024-03-05 RX ADMIN — CEFTAZIDIME 4.5 MG: 1 INJECTION, POWDER, FOR SOLUTION INTRAMUSCULAR; INTRAVENOUS at 12:29

## 2024-03-05 ASSESSMENT — VISUAL ACUITY
OS_PH_SC: 20/100
OS_SC+: -1
OD_SC: LP WITH PROJECTION
OS_SC: 20/125
METHOD: SNELLEN - LINEAR
OS_PH_SC+: -1

## 2024-03-05 ASSESSMENT — SLIT LAMP EXAM - LIDS: COMMENTS: NORMAL

## 2024-03-05 ASSESSMENT — TONOMETRY
OS_IOP_MMHG: 6
OD_IOP_MMHG: 7
IOP_METHOD: ICARE

## 2024-03-05 ASSESSMENT — EXTERNAL EXAM - RIGHT EYE: OD_EXAM: NORMAL

## 2024-03-05 NOTE — PROGRESS NOTES
CC -   Post Op    INTERVAL HISTORY - Initial visit with me  POD #1 OD s/p PPV/vit Bx/ABx for endophthalmitis and secondary aqueous misdirection after IVT injection OU on Friday 3/1/24 (?Eylea)    Vit Bx OD 3/3/24  Gram stain - 3+ g+ cocci, 3+ g- coccobacilli, 3+ WBC  Aerobic Cx - strep oralis/mitis (3/5/24)  Anaerobic Cx - no growth (3/5/24)  Fungal Cx - no growth  (3/5/24)    [facility] -   Janna Cris Mejía is a  89 year old year-old patient with history of aqueous misdirection & endophthalmitis OD diagnosed 3/3/24, had high IOP, shallow chamber, and severe inflammation OD 3/3/24 after injection (?Eylea) OU by Dr. Espinoza 3/1/24    PAST OCULAR SURGERY  CE/IOL OU ~ 2018  PPV/Vit Bx/ABx OD 3/3/24    RETINAL IMAGING:  U/S B-scan 03/05/24  OD - choroid & eye wall thickening, minimal vitritis, no RD/CD/mass; worse vit opacities compared to yesterday     ASSESSMENT & PLAN    # Endophthalmitis OD   - Dx 3/3/24 after injection (?Eylea) 3/1/24 by Alexis   - s/p PPV/ABx 3/3/24     - Gram stain G+ & G- organisms; culture : strep oralis/mitis   - worsening AC reaction and vit opacities   - repeat IV injection vancomycin and ceftazidime today   - RTC tomorrow; consider repeat PPV    #  H/o aqueous misdirection OD   - likely secondary to endophthalmitis   - s/p PPV 3/3/24   - IOP good today, chamber deep    # Wet AMD OU   - injected with Eylea by Dr. Espinoza   - last njection OU 3/1/24 per patient   - per patient on 7 week interval    # Syneresis OS      # Pseudophakia OU      1 day with Jigna      Complete documentation of historical and exam elements from today's encounter can be found in the full encounter summary report (not reduplicated in this progress note). I personally obtained the chief complaint(s) and history of present illness.  I confirmed and edited as necessary the review of systems, past medical/surgical history, family history, social history, and examination findings as documented by others; and I  examined the patient myself. I personally reviewed the relevant tests, images, and reports as documented above. I formulated and edited as necessary the assessment and plan and discussed the findings and management plan with the patient and family.    Tomás Benito MD, PhD

## 2024-03-05 NOTE — NURSING NOTE
Chief Complaints and History of Present Illnesses   Patient presents with    Post Op (Ophthalmology) Right Eye     Chief Complaint(s) and History of Present Illness(es)       Post Op (Ophthalmology) Right Eye              Laterality: right eye    Associated symptoms: eye pain    Pain scale: 5/10              Comments    Janna is here two days post right eye vitrectomy. She says her right eye has been painful since surgery.    Sammy Cunningham COT 9:36 AM March 5, 2024

## 2024-03-05 NOTE — TELEPHONE ENCOUNTER
Right eye vitreous fluid cultures/grams stain performed 3-3-2024    Lab calling with updated results of gram stain--- corrective reports    -- 1. Previous gram neg cocci bacilli, but after further review-- no gram neg cocci bacilli.  Update lab seeing gram positive cocci.    Note to Dr. Benito (in clinic today) pt has appt today.    Abe Trejo RN 12:08 PM 03/05/24

## 2024-03-06 ENCOUNTER — OFFICE VISIT (OUTPATIENT)
Dept: OPHTHALMOLOGY | Facility: CLINIC | Age: 89
End: 2024-03-06
Attending: OPHTHALMOLOGY
Payer: MEDICARE

## 2024-03-06 DIAGNOSIS — Z48.810 AFTERCARE FOLLOWING SURGERY OF A SENSE ORGAN: Primary | ICD-10-CM

## 2024-03-06 DIAGNOSIS — H44.001 ACUTE ENDOPHTHALMITIS, RIGHT: ICD-10-CM

## 2024-03-06 LAB
BACTERIA FLD CULT: ABNORMAL
BACTERIA FLD CULT: ABNORMAL

## 2024-03-06 PROCEDURE — G0463 HOSPITAL OUTPT CLINIC VISIT: HCPCS | Performed by: OPHTHALMOLOGY

## 2024-03-06 PROCEDURE — 76512 OPH US DX B-SCAN: CPT | Performed by: OPHTHALMOLOGY

## 2024-03-06 PROCEDURE — 99024 POSTOP FOLLOW-UP VISIT: CPT | Performed by: OPHTHALMOLOGY

## 2024-03-06 RX ORDER — ATROPINE SULFATE 10 MG/ML
1-2 SOLUTION/ DROPS OPHTHALMIC 2 TIMES DAILY
Qty: 5 ML | Refills: 1 | Status: SHIPPED | OUTPATIENT
Start: 2024-03-06 | End: 2024-05-31

## 2024-03-06 ASSESSMENT — VISUAL ACUITY
OS_SC: 20/125
OS_SC+: -1
OD_SC: HM
METHOD: SNELLEN - LINEAR

## 2024-03-06 ASSESSMENT — EXTERNAL EXAM - RIGHT EYE: OD_EXAM: NORMAL

## 2024-03-06 ASSESSMENT — SLIT LAMP EXAM - LIDS: COMMENTS: NORMAL

## 2024-03-06 ASSESSMENT — TONOMETRY
OD_IOP_MMHG: 05
IOP_METHOD: TONOPEN
OS_IOP_MMHG: 07

## 2024-03-06 NOTE — NURSING NOTE
Chief Complaints and History of Present Illnesses   Patient presents with    Post Op (Ophthalmology) Right Eye     3 day post op PPV/Vit Bx/ABx OD 3/3/24  Sore and tender  Prednisolone qid  Ofloxacin qid   Zully Page Cox [place] 12:46 PM March 6, 2024          Chief Complaint(s) and History of Present Illness(es)       Post Op (Ophthalmology) Right Eye              Laterality: right eye    Associated symptoms: pain with eye movement.  Negative for eye pain    Treatments tried: eye drops    Comments: 3 day post op PPV/Vit Bx/ABx OD 3/3/24  Sore and tender  Prednisolone qid  Ofloxacin qid   Zully Page Cox [place] 12:46 PM March 6, 2024

## 2024-03-06 NOTE — PROGRESS NOTES
CC -   Post Op    INTERVAL HISTORY - Initial visit with me  POD #1 OD s/p PPV/vit Bx/ABx for endophthalmitis and secondary aqueous misdirection after IVT injection OU on Friday 3/1/24 (?Eylea)    Vit Bx OD 3/3/24  Gram stain - 3+ g+ cocci, 3+ g- coccobacilli, 3+ WBC  Aerobic Cx - strep oralis/mitis (3/5/24) sensitive to vancomycin  Anaerobic Cx - no growth (3/5/24)  Fungal Cx - no growth  (3/5/24)      [facility] -   [facility] Cris Mejía is a  89 year old year-old patient with history of aqueous misdirection & endophthalmitis OD diagnosed 3/3/24, had high IOP, shallow chamber, and severe inflammation OD 3/3/24 after injection (Vabysmo) OU by Dr. Espinoza 3/1/24    PAST OCULAR SURGERY  CE/IOL OU ~ 2018  PPV/Vit Bx/ABx OD 3/3/24    RETINAL IMAGING:  U/S B-scan 03/05/24  OD - choroid & eye wall thickening, minimal vitritis, no RD/CD/mass; worse vit opacities compared to yesterday     ASSESSMENT & PLAN    # Endophthalmitis OD   - Dx 3/3/24 after injection (Vabysmo) 3/1/24 by Alexis   - s/p PPV/ABx 3/3/24     - Gram stain G+ & G- organisms; culture : strep oralis/mitis   - 3/5/24: repeat IV injection vancomycin and ceftazidime    - 3/6/24: AC reaction possibly better but more inflammatory debris in vit cavity and IOP 05   - will likely benefit from another PPV and vit and AC washout with possible SO in vit cavity   - will discuss with Dr. Isabel    - continue prednisolone 6/day   - continue ofloxacin 4/day   - atropine BID right eye     #  H/o aqueous misdirection OD   - likely secondary to endophthalmitis   - s/p PPV 3/3/24   - IOP good today, chamber deep    # Wet AMD OU   - injected with Vabysmo by Dr. Espinoza   - last njection OU 3/1/24 per patient   - per patient on 7 week interval    # Syneresis OS  # Pseudophakia OU    Complete documentation of historical and exam elements from today's encounter can be found in the full encounter summary report (not reduplicated in this progress note). I personally obtained  History and hospital course    Pt w/  hx of sys/diastolic HF, aortic stenosis, CAD status post CABG, DVT/PE on Eliquis, spinal stenosis, CKD-4, diabetes, hypertension, hyperlipidemia and pph neuropathy was here for leg swelling and blisters for a few  ds PTA.  pain in both legs more on Lt side, dano ITB area. no recent trauma. Lt side headache- chr intermittent. similar pain during last adm and neg MRI/MRA. ? lt side chest pain: chr and intermittent. no current cp after adm. Lt side headache was triggered w/ certain motion better w/ relaxation. SOB w/ walking but currently no sob w/ relaxation. no n/v or dizziness. no abdo pain. He had blisters on both legs and larger on lt side. not open. EKG w/o sig interval change. BNP was high in ER.  given one dose of furosemide in ER. Next day, pt was doing much better  slept well  no pain in the head/neck, though later recurred. leg pain was much better too  no sob or cp  no abdo pain  stable renal fx w/ baseline Cr near 2    Was seen by cardiology. vol status was not sig out of control  was continued on iv diuretics  leg swelling was getting better and blisters were shrinking. no open draining or d/c. Once, he had an episode of delirium at night. he was monitored off ativan prn.  no further issue w/ delirium    PMH: As above, BPH, UI and IBS  SH: ,  Denies tobacco, alcohol, illicit drug use  PSH: CABG - Coronary artery bypass graft    Total knee replacement, Bilateral    Cataract surgery, Bilateral   FH: No positive family history reported. Allergies: Allergies (4) Active Reaction  Beef None Documented  penicillin itching  Pork Itching  Red chilli Itching    Review of Systems   as in HPI  no fever  no n/v  no cp or sob  mild hip/leg and back pain  .         Physical Examination   VS/Measurements        Vitals between:   29-JUL-2018 07:47:31   TO   30-JUL-2018 07:47:31                   LAST RESULT MINIMUM MAXIMUM  Temperature 36.9 36.0 36.9  Heart Rate 60 50 63  Respiratory Rate 16 16 16  NISBP           129 109 136  NIDBP           55 40 55  NIMBP           80 63 82  SpO2                    99 99 100     General:  No acute distress. Eye:  Normal conjunctiva. HENT:  Normocephalic. Neck:  Supple, Non-tender, No jugular venous distention. Respiratory:  Lungs are clear to auscultation, Respirations are non-labored. Cardiovascular:  Normal rate, Regular rhythm. Gastrointestinal:  Soft, Non-tender, Non-distended. Musculoskeletal:  no acute joint infl change  . Neurologic:  no facial asym  no tremor  no confusion/delirium. Psychiatric:  Cooperative, Appropriate mood & affect.        Review / Management   Laboratory results:       Labs between:  29-JUL-2018 07:47 to 30-JUL-2018 07:47    BMP:                 Na  Cl  BUN  Glu   30-JUL-2018 138  105  (H) 56  (H) 195                              K  CO2  Cr  Ca                              4.6  24  (H) 1.74  8.9     POC GLU:                 Latest Result  Latest Date  Minimum  Min Date  Maximum  Max Date                             (H) 158  30-JUL-2018 (H) 158  30-JUL-2018 (H) 185  29-JUL-2018                   Dx and Plan    Confusion/delirium      resolved and was mild      likely from fatigue and med effect      off prn ativan    CHF exacerbation     sys and diastolic     on PO bumex and stable lab findings    Venous insuff     monitor and consider wound care consult if blister opens     improving    Headache and Neck pain       had similar sx in the past       from static concentric contraction during his viewing behavior       still, reporducible and reversiable w/ movement       patterned behavior    DM      better controlled    CKD-4      stable     BPH and UI    Convergence insuff       near-point activity- dano, pt's continuing reading printed material can be troubling d/o excessive  static convergence can cause static contraction the chief complaint(s) and history of present illness.  I confirmed and edited as necessary the review of systems, past medical/surgical history, family history, social history, and examination findings as documented by others; and I examined the patient myself. I personally reviewed the relevant tests, images, and reports as documented above. I formulated and edited as necessary the assessment and plan and discussed the findings and management plan with the patient and family.    Tomás Benito MD, PhD         on craniocervical m    OA and spinal stenosis       at baseline    Mild anxiety and IBS       has been stable    doing well  d/c w/ HH and home PT  wound care if blister opens, w/ HH    talked to pt's son about d/c planning  f/up lab in 1 wk                 Electronically Signed On 08/09/2018 12:59  __________________________________________________   Masoud Cherry

## 2024-03-08 ENCOUNTER — OFFICE VISIT (OUTPATIENT)
Dept: OPHTHALMOLOGY | Facility: CLINIC | Age: 89
End: 2024-03-08
Attending: OPHTHALMOLOGY
Payer: MEDICARE

## 2024-03-08 DIAGNOSIS — Z98.890 POST-OPERATIVE STATE: ICD-10-CM

## 2024-03-08 DIAGNOSIS — H18.20 CORNEAL EDEMA OF RIGHT EYE: Primary | ICD-10-CM

## 2024-03-08 DIAGNOSIS — H44.001 ACUTE ENDOPHTHALMITIS, RIGHT: ICD-10-CM

## 2024-03-08 PROCEDURE — 99207 ULTRASOUND B-SCAN OD (RIGHT EYE): CPT | Mod: 26 | Performed by: OPHTHALMOLOGY

## 2024-03-08 PROCEDURE — 76512 OPH US DX B-SCAN: CPT | Performed by: OPHTHALMOLOGY

## 2024-03-08 PROCEDURE — 99024 POSTOP FOLLOW-UP VISIT: CPT | Performed by: OPHTHALMOLOGY

## 2024-03-08 PROCEDURE — G0463 HOSPITAL OUTPT CLINIC VISIT: HCPCS | Performed by: OPHTHALMOLOGY

## 2024-03-08 ASSESSMENT — VISUAL ACUITY
OS_PH_SC: 20/125
OS_SC: 20/150
OD_SC: LP
METHOD: SNELLEN - LINEAR

## 2024-03-08 ASSESSMENT — TONOMETRY
OS_IOP_MMHG: 06
OD_IOP_MMHG: 02
OS_IOP_MMHG: 07
IOP_METHOD: ICARE
IOP_METHOD: ICARE
OD_IOP_MMHG: 02

## 2024-03-08 ASSESSMENT — SLIT LAMP EXAM - LIDS: COMMENTS: NORMAL

## 2024-03-08 ASSESSMENT — EXTERNAL EXAM - RIGHT EYE: OD_EXAM: NORMAL

## 2024-03-08 NOTE — PATIENT INSTRUCTIONS
POST-OPERATIVE INSTRUCTIONS FOLLOWING RETINA SURGERY    Hallie Isabel MD, PhD  Department of Ophthalmology  [facility]  [phone]      ACTIVITY:  No heavy lifting for 2 weeks after surgery.  No swimming for 3 weeks after surgery.  It is OK for you to shower, to wash your face, and to wash your hair.  Allow the shower to hit the top of your head and wash down your face.  Do not hit your eye directly with the jet from the shower.  Keep your eye covered with the shield when you sleep for 1 week after surgery.  If your shield has a tab, it is designed to go over the bridge of your nose.  Place one piece of tape diagonally from the center of your forehead to the side of your cheek to secure the shield.   During the daytime, you can use either the shield or your regular eyeglasses or sunglasses to protect your eye.    EYE DROPS  Use these drops after surgery.  When using more than one drop, separate them by 3 minutes between drops.  Common times to place drops are breakfast, lunch, dinner, and bedtime.  Do not stop your drops without discussing with our office.  If you run out before your appointment, call and we will send in a refill.      Ofloxacin --- 4 times per day  Pred Forte (prednisolone acetate) --- 4 times per day  atropine ---- 1 per day    WHAT TO EXPECT  It is common for the eye to to have a blood tinged discharge for a few days after surgery  It may feel irritated (as if something were in your eye), for there to be clear discharge (thicker in the mornings upon awakening), and for it to be bloodshot for 2-3 weeks following retina surgery.   Your vision is also commonly decreased during this time due to the bubble.          WHAT TO WATCH OUT FOR  If you experience any of the following, you should call immediately:  Increasing pain  Increasing nausea or vomiting  Increasing redness  Worsening or darkening of the vision  New flashing lights or floaters      For any of the symptoms listed  above, or for other concerns, call [phone] and ask to speak to the clinic nurse.  If you call after hours, follow to prompts to reach the doctor on call.

## 2024-03-08 NOTE — PROGRESS NOTES
CC -   Post Op    INTERVAL HISTORY - No pain overnight, vision unchanged    Vit Bx OD 3/3/24  Gram stain - 3+ g+ cocci,  3+ WBC  Aerobic Cx - 3+ strept oralis  Anaerobic Cx - NTD (3/8/24)  Fungal Cx - NTD (3/8/24)        [facility] -   Janna Cris Mejía is a  89 year old year-old patient with history of aqueous misdirection & endophthalmitis OD diagnosed 3/3/24, had high IOP, shallow chamber, and severe inflammation OD 3/3/24 after injection (?Eylea) OU by Dr. Espinoza 3/1/24        PAST OCULAR SURGERY  CE/IOL OU ~ 2018  PPV/Vit Bx/ABx OD 3/3/24 ([facility])  PPV/vit Bx/ABx OD 3/7/24 (DDK)      RETINAL IMAGING:  U/S B-scan 03/04/24  OD - choroid & eye wall thickening, low choroidal, residual debris on eye wall,  minimal vitritis, retina attached      ASSESSMENT & PLAN    # POD #1 OD   - s/p PPV/ABx 3/7/24   - s/p PPV/ABx/Dex 3/3/24       - IOP low   - U/S retina flat   - no new infection     - PF 4/day, oflox 4/day, atropine 1/day   - BCL d/t epi defect (LOT [id number] exp 2025-6-1)      # Endophthalmitis OD   - Dx 3/3/24 after injection (?Eylea) 3/1/24 by Alexis   - s/p PPV/ABx 3/3/24   - s/p ABx in clinic 3/5/24   - s/p PPV/ABx 3/7/24     - today appears improving        #  H/o aqueous misdirection OD   - likely secondary to endophthalmitis   - s/p PPV 3/3/24   - IOP good today, chamber deep      # Wet AMD OU   - injected with Eylea by Dr. Espinoza   - last njection OU 3/1/24 per patient   - per patient on 7 week interval      # Syneresis OS      # Pseudophakia OU      Return in about 3 days (around 3/11/2024) for DFE OD, US OD.       ATTESTATION     Attending Attestation:     Complete documentation of historical and exam elements from today's encounter can be found in the full encounter summary report (not reduplicated in this progress note).  I personally obtained the chief complaint(s) and history of present illness.  I confirmed and edited as necessary the review of systems, past medical/surgical history, family  history, social history, and examination findings as documented by others; and I examined the patient myself.  I personally reviewed the relevant tests, images, and reports as documented above.  I formulated and edited as necessary the assessment and plan and discussed the findings and management plan with the patient and family    Hallie Isabel MD, PhD  , Vitreoretinal Surgery  Department of Ophthalmology  [facility]

## 2024-03-10 LAB
BACTERIA FLD CULT: NORMAL
BACTERIA FLD CULT: NORMAL

## 2024-03-11 ENCOUNTER — OFFICE VISIT (OUTPATIENT)
Dept: OPHTHALMOLOGY | Facility: CLINIC | Age: 89
End: 2024-03-11
Attending: OPHTHALMOLOGY
Payer: MEDICARE

## 2024-03-11 DIAGNOSIS — H18.20 CORNEAL EDEMA OF RIGHT EYE: Primary | ICD-10-CM

## 2024-03-11 DIAGNOSIS — H31.421: ICD-10-CM

## 2024-03-11 DIAGNOSIS — Z98.890 POST-OPERATIVE STATE: ICD-10-CM

## 2024-03-11 PROCEDURE — 99024 POSTOP FOLLOW-UP VISIT: CPT | Performed by: OPHTHALMOLOGY

## 2024-03-11 PROCEDURE — 76512 OPH US DX B-SCAN: CPT | Performed by: OPHTHALMOLOGY

## 2024-03-11 PROCEDURE — 99207 ULTRASOUND B-SCAN OD (RIGHT EYE): CPT | Mod: 26 | Performed by: OPHTHALMOLOGY

## 2024-03-11 PROCEDURE — G0463 HOSPITAL OUTPT CLINIC VISIT: HCPCS | Performed by: OPHTHALMOLOGY

## 2024-03-11 ASSESSMENT — TONOMETRY
OD_IOP_MMHG: 12
OS_IOP_MMHG: 16
IOP_METHOD: TONOPEN

## 2024-03-11 ASSESSMENT — VISUAL ACUITY
OS_SC: 20/200
METHOD: SNELLEN - LINEAR
OD_SC: HM

## 2024-03-11 ASSESSMENT — SLIT LAMP EXAM - LIDS: COMMENTS: NORMAL

## 2024-03-11 ASSESSMENT — EXTERNAL EXAM - RIGHT EYE: OD_EXAM: NORMAL

## 2024-03-11 NOTE — LETTER
3/11/2024       RE: Janna Mejía  [address] [place] [address]     Dear Colleague,    Thank you for referring your patient, Janna Mejía, to the [facility] [place] at [facility]. Please see a copy of my visit note below.    CC -   Post Op    INTERVAL HISTORY - No pain overnight, vision ?better    Vit Bx OD 3/3/24  Gram stain - 3+ g+ cocci,  3+ WBC  Aerobic Cx - 3+ strept oralis  Anaerobic Cx - NTD (3/11/24)  Fungal Cx - NTD (3/11/24)    Vit Bx OD 3/7/24  at [facility] not yet available    [facility] -   Janna Mejía is a  89 year old year-old patient with history of aqueous misdirection & endophthalmitis OD diagnosed 3/3/24, had high IOP, shallow chamber, and severe inflammation OD 3/3/24 after injection (?Eylea) OU by Dr. Espinoza 3/1/24    PAST OCULAR SURGERY  CE/IOL OU ~ 2018  PPV/Vit Bx/ABx OD 3/3/24 ([facility])  PPV/vit Bx/ABx OD 3/7/24 ([facility])    RETINAL IMAGING:  U/S B-scan 03/04/24  OD - mild/mod serous choroidals, PHF reflective with debris subhyaloid inferior/temporal, no RD    ASSESSMENT & PLAN  # POD #4 OD   - s/p PPV/ABx 3/7/24   - s/p PPV/ABx/Dex 3/3/24       - IOP OK   - U/S retina flat, mod serous choroidals   - no new infection     - stop atropine, continue PF 4/day, stop oflox on Friday (1 week post surgery)   - BCL removed (was discovered on temporal sclera off cornea    # Endophthalmitis OD   - Dx 3/3/24 after injection (?Eylea) 3/1/24 by Alexis   - s/p PPV/ABx 3/3/24   - s/p ABx in clinic 3/5/24   - s/p PPV/ABx 3/7/24     - today appears improving    #  H/o aqueous misdirection OD   - likely secondary to endophthalmitis   - s/p PPV 3/3/24   - IOP good today, chamber deep    # Wet AMD OU   - injected with Eylea by Dr. Espinoza   - last njection OU 3/1/24 per patient   - per patient on 7 week interval   - planned injection with Alexis 4/19/24 already scheduled    # Syneresis OS      # Pseudophakia OU    Return in about 1 week (around  3/18/2024) for DFE OD, Optos OU, OCT OS, U/S OD.     ATTESTATION   Attending Attestation: Complete documentation of historical and exam elements from today's encounter can be found in the full encounter summary report (not reduplicated in this progress note).  I personally obtained the chief complaint(s) and history of present illness.  I confirmed and edited as necessary the review of systems, past medical/surgical history, family history, social history, and examination findings as documented by others; and I examined the patient myself.  I personally reviewed the relevant tests, images, and reports as documented above.  I formulated and edited as necessary the assessment and plan and discussed the findings and management plan with the patient and family.  Hallie Isabel MD, PhD    Base Eye Exam       Visual Acuity (Snellen - Linear)         Right Left    Dist sc HM 20/200              Tonometry (Tonopen, 10:27 AM)         Right Left    Pressure 12 16              Neuro/Psych       Oriented x3: Yes    Mood/Affect: Normal              Dilation       Right eye: 1.0% Mydriacyl, 2.5% Angel Synephrine @ 10:27 AM                  Slit Lamp and Fundus Exam       External Exam         Right Left    External Normal               Slit Lamp Exam         Right Left    Lids/Lashes Normal     Conjunctiva/Sclera 2-3+ inj/JENNA     Cornea 2+ edema, epi intact, BCL removed     Anterior Chamber deep, clear     Iris poorly dilated, mild consolidated fibrin on iris     Lens PCIOL     Vitreous poor  red reflex, no view               Fundus Exam         Right Left    Disc no view                   Again, thank you for allowing me to participate in the care of your patient.      Sincerely,    Hallie Isabel MD, PhD  , Vitreoretinal Surgery  Department of Ophthalmology & Visual Neurosciences  [facility]   No

## 2024-03-11 NOTE — NURSING NOTE
Chief Complaints and History of Present Illnesses   Patient presents with    Post Op (Ophthalmology) Right Eye     Chief Complaint(s) and History of Present Illness(es)       Post Op (Ophthalmology) Right Eye              Laterality: right eye    Course: stable    Associated symptoms: eye pain (pressure sensation).  Negative for flashes and floaters    Treatments tried: eye drops              Comments    Here for post op right eye. VA is about the same. Some pressure sensation. No flashes or floaters.    Alberto Sin COT 10:24 AM March 11, 2024

## 2024-03-11 NOTE — PROGRESS NOTES
CC -   Post Op    INTERVAL HISTORY - No pain overnight, vision ?better    Vit Bx OD 3/3/24  Gram stain - 3+ g+ cocci,  3+ WBC  Aerobic Cx - 3+ strept oralis  Anaerobic Cx - NTD (3/11/24)  Fungal Cx - NTD (3/11/24)    Vit Bx OD 3/7/24  at [facility] not yet available      [facility] -   Janna Mejía is a  89 year old year-old patient with history of aqueous misdirection & endophthalmitis OD diagnosed 3/3/24, had high IOP, shallow chamber, and severe inflammation OD 3/3/24 after injection (?Eylea) OU by Dr. Espinoza 3/1/24        PAST OCULAR SURGERY  CE/IOL OU ~ 2018  PPV/Vit Bx/ABx OD 3/3/24 (HN)  PPV/vit Bx/ABx OD 3/7/24 (DDK)      RETINAL IMAGING:  U/S B-scan 03/04/24  OD - mild/mod serous choroidals, PHF reflective with debris subhyaloid inferior/temporal, no RD      ASSESSMENT & PLAN    # POD #4 OD   - s/p PPV/ABx 3/7/24   - s/p PPV/ABx/Dex 3/3/24       - IOP OK   - U/S retina flat, mod serous choroidals   - no new infection     - stop atropine, continue PF 4/day, stop oflox on Friday (1 week post surgery)   - BCL removed (was discovered on temporal sclera off cornea        # Endophthalmitis OD   - Dx 3/3/24 after injection (?Eylea) 3/1/24 by Alexis   - s/p PPV/ABx 3/3/24   - s/p ABx in clinic 3/5/24   - s/p PPV/ABx 3/7/24     - today appears improving        #  H/o aqueous misdirection OD   - likely secondary to endophthalmitis   - s/p PPV 3/3/24   - IOP good today, chamber deep      # Wet AMD OU   - injected with Eylea by Dr. Espinoza   - last njection OU 3/1/24 per patient   - per patient on 7 week interval   - planned injection with Alexis 4/19/24 already scheduled      # Syneresis OS      # Pseudophakia OU      Return in about 1 week (around 3/18/2024) for DFE OD, Optos OU, OCT OS, U/S OD.       ATTESTATION     Attending Attestation:     Complete documentation of historical and exam elements from today's encounter can be found in the full encounter summary report (not reduplicated in this progress note).  I  personally obtained the chief complaint(s) and history of present illness.  I confirmed and edited as necessary the review of systems, past medical/surgical history, family history, social history, and examination findings as documented by others; and I examined the patient myself.  I personally reviewed the relevant tests, images, and reports as documented above.  I formulated and edited as necessary the assessment and plan and discussed the findings and management plan with the patient and family    Hallie Isabel MD, PhD  , Vitreoretinal Surgery  Department of Ophthalmology  [facility]

## 2024-03-19 ENCOUNTER — OFFICE VISIT (OUTPATIENT)
Dept: OPHTHALMOLOGY | Facility: CLINIC | Age: 89
End: 2024-03-19
Attending: OPHTHALMOLOGY
Payer: MEDICARE

## 2024-03-19 DIAGNOSIS — H44.001 RIGHT ENDOPHTHALMIA: ICD-10-CM

## 2024-03-19 DIAGNOSIS — Z98.890 POST-OPERATIVE STATE: Primary | ICD-10-CM

## 2024-03-19 PROCEDURE — 99024 POSTOP FOLLOW-UP VISIT: CPT | Performed by: OPHTHALMOLOGY

## 2024-03-19 PROCEDURE — 92250 FUNDUS PHOTOGRAPHY W/I&R: CPT | Performed by: OPHTHALMOLOGY

## 2024-03-19 PROCEDURE — 99207 FUNDUS PHOTOS OU (BOTH EYES): CPT | Mod: 26 | Performed by: OPHTHALMOLOGY

## 2024-03-19 PROCEDURE — 92134 CPTRZ OPH DX IMG PST SGM RTA: CPT | Performed by: OPHTHALMOLOGY

## 2024-03-19 PROCEDURE — G0463 HOSPITAL OUTPT CLINIC VISIT: HCPCS | Performed by: OPHTHALMOLOGY

## 2024-03-19 PROCEDURE — 99207 OCT RETINA SPECTRALIS OS (LEFT EYE): CPT | Mod: 26 | Performed by: OPHTHALMOLOGY

## 2024-03-19 PROCEDURE — 99207 ULTRASOUND B-SCAN OD (RIGHT EYE): CPT | Mod: 26 | Performed by: OPHTHALMOLOGY

## 2024-03-19 PROCEDURE — 76512 OPH US DX B-SCAN: CPT | Performed by: OPHTHALMOLOGY

## 2024-03-19 RX ORDER — PREDNISOLONE ACETATE 10 MG/ML
1 SUSPENSION/ DROPS OPHTHALMIC 3 TIMES DAILY
Qty: 5 ML | Refills: 1 | Status: SHIPPED | OUTPATIENT
Start: 2024-03-19 | End: 2024-08-12

## 2024-03-19 ASSESSMENT — TONOMETRY
IOP_METHOD: ICARE
OD_IOP_MMHG: 3
OS_IOP_MMHG: 12

## 2024-03-19 ASSESSMENT — VISUAL ACUITY
OS_SC: 20/125
METHOD: SNELLEN - LINEAR
OD_SC: LP

## 2024-03-19 ASSESSMENT — SLIT LAMP EXAM - LIDS: COMMENTS: NORMAL

## 2024-03-19 ASSESSMENT — EXTERNAL EXAM - RIGHT EYE: OD_EXAM: NORMAL

## 2024-03-19 NOTE — NURSING NOTE
Chief Complaints and History of Present Illnesses   Patient presents with    Follow Up     1 week follow up      Chief Complaint(s) and History of Present Illness(es)       Follow Up              Associated symptoms: headache.  Negative for flashes and floaters    Treatments tried: eye drops    Comments: 1 week follow up               Comments    Pt states no VA changes since last visit.   Occasional pressure over right eye, and headaches most days.   Takes Tylenol for relief.  No new flashes or floaters.     Ocular meds:  Prednisolone QID right eye (running out)     Luis De La Rosa 2:08 PM March 19, 2024

## 2024-03-19 NOTE — PROGRESS NOTES
CC -   Post Op    INTERVAL HISTORY - occasional aching OD, using PF 4/day        PMH -   Janna Cris Mejía is a  89 year old year-old patient with history of aqueous misdirection & endophthalmitis OD diagnosed 3/3/24, had high IOP, shallow chamber, and severe inflammation OD 3/3/24 after injection (?Eylea) OU by Dr. Espinoza 3/1/24      Vit Bx OD 3/3/24  Gram stain - 3+ g+ cocci,  3+ WBC  Aerobic Cx - 3+ strept oralis  Anaerobic Cx - NTD (3/11/24)  Fungal Cx - NTD (3/11/24)    Vit Bx OD 3/7/24  LEYDI - no organisms, cultures aerobic & anaerobic negative        PAST OCULAR SURGERY  CE/IOL OU ~ 2018  PPV/Vit Bx/ABx OD 3/3/24 ([facility])  PPV/vit Bx/ABx OD 3/7/24 (DDK)      RETINAL IMAGING:  U/S B-scan 03/04/24  OD - ?nasal peripheral RD, no choroidals    OCT 03/19/24  OD - unable  OS - large GA, central subretinal deposits/drusen ?old CNVM      ASSESSMENT & PLAN    # POW #2 OD   - s/p PPV/ABx 3/7/24   - s/p PPV/ABx/Dex 3/3/24       - IOP low    - U/S ?RD   - no new infection       - reduce PF to TID   - recheck next week    # RD OD   - suspicious on U/S 3/19/24   - unclear signifcance given LP vision    - uncertain vision potential, given view would expect HM vision   - monitor closely   - recheck next week        # Endophthalmitis OD   - Dx 3/3/24 after injection (?Eylea) 3/1/24 by Alexis   - s/p PPV/ABx 3/3/24   - s/p ABx in clinic 3/5/24   - s/p PPV/ABx 3/7/24     - likely resolved        #  H/o aqueous misdirection OD   - likely secondary to endophthalmitis   - s/p PPV 3/3/24   - IOP good today, chamber deep      # Wet AMD OD   - last injection 3/1/24   - monitor for now        # Wet AMD OS   - injected with Eylea by Dr. Espinoza   - last njection OU 3/1/24 per patient   - per patient on 7 week interval   - next planned injection with Espinoza 4/19/24 already scheduled   - wishes to transfer to [facility]   - will consider PRN approach OS      # Syneresis OS      # Pseudophakia OU      Return in about 1 week (around  3/26/2024) for DFE OD, US OD.       ATTESTATION     Attending Attestation:     Complete documentation of historical and exam elements from today's encounter can be found in the full encounter summary report (not reduplicated in this progress note).  I personally obtained the chief complaint(s) and history of present illness.  I confirmed and edited as necessary the review of systems, past medical/surgical history, family history, social history, and examination findings as documented by others; and I examined the patient myself.  I personally reviewed the relevant tests, images, and reports as documented above.  I formulated and edited as necessary the assessment and plan and discussed the findings and management plan with the patient and family    Hallie Isabel MD, PhD  , Vitreoretinal Surgery  Department of Ophthalmology  [facility]

## 2024-03-25 ENCOUNTER — OFFICE VISIT (OUTPATIENT)
Dept: OPHTHALMOLOGY | Facility: CLINIC | Age: 89
End: 2024-03-25
Attending: OPHTHALMOLOGY
Payer: MEDICARE

## 2024-03-25 DIAGNOSIS — Z98.890 POST-OPERATIVE STATE: Primary | ICD-10-CM

## 2024-03-25 PROCEDURE — G0463 HOSPITAL OUTPT CLINIC VISIT: HCPCS | Performed by: OPHTHALMOLOGY

## 2024-03-25 PROCEDURE — 99207 ULTRASOUND B-SCAN OD (RIGHT EYE): CPT | Mod: 26 | Performed by: OPHTHALMOLOGY

## 2024-03-25 PROCEDURE — 99024 POSTOP FOLLOW-UP VISIT: CPT | Performed by: OPHTHALMOLOGY

## 2024-03-25 PROCEDURE — 76512 OPH US DX B-SCAN: CPT | Performed by: OPHTHALMOLOGY

## 2024-03-25 ASSESSMENT — VISUAL ACUITY
METHOD: SNELLEN - LINEAR
OD_CC: LP
CORRECTION_TYPE: GLASSES
OS_CC: 20/60-

## 2024-03-25 ASSESSMENT — TONOMETRY
IOP_METHOD: TONOPEN
OD_IOP_MMHG: 05
OS_IOP_MMHG: 11

## 2024-03-25 ASSESSMENT — SLIT LAMP EXAM - LIDS: COMMENTS: NORMAL

## 2024-03-25 ASSESSMENT — EXTERNAL EXAM - RIGHT EYE: OD_EXAM: NORMAL

## 2024-03-25 NOTE — PROGRESS NOTES
CC -   Post Op    INTERVAL HISTORY - slight pressure above OD, per family still getting HA  sometimes requiring tramadol  using PF 3/day        PMH -   Janna Mejía is a  89 year old year-old patient with history of aqueous misdirection & endophthalmitis OD diagnosed 3/3/24, had high IOP, shallow chamber, and severe inflammation OD 3/3/24 after injection (?Eylea) OU by Dr. Espinoza 3/1/24      Vit Bx OD 3/3/24  Gram stain - 3+ g+ cocci,  3+ WBC  Aerobic Cx - 3+ strept oralis  Anaerobic Cx - NTD (3/11/24)  Fungal Cx - NTD (3/11/24)    Vit Bx OD 3/7/24  LEYDI - no organisms, cultures aerobic & anaerobic negative        PAST OCULAR SURGERY  CE/IOL OU ~ 2018  PPV/Vit Bx/ABx OD 3/3/24 ([facility])  PPV/vit Bx/ABx OD 3/7/24 (DDK)      RETINAL IMAGING:  U/S B-scan 03/25/24   OD - ?nasal peripheral RD, no choroidals    OCT 03/19/24  OD - unable  OS - large GA, central subretinal deposits/drusen ?old CNVM      ASSESSMENT & PLAN    # POW #2.5 OD   - s/p PPV/ABx 3/7/24   - s/p PPV/ABx/Dex 3/3/24       - IOP low/OK    - U/S ?RD nasal mid-periphery stable   - no new infection       - reduce PF to BID   - add jennyfer 3/day to improve view     - recheck next 1-2 weeks (traveling, will be 4/12/24 likely)    # RD OD   - suspicious on U/S 3/19/24   - unclear signifcance given LP vision    - uncertain vision potential, given view would expect HM vision   - no change today on U/S, VA still LP (confirmed by me)     - monitor closely   - recheck 2 weeks (traveling will be 4/12/24 likely)        # Endophthalmitis OD   - Dx 3/3/24 after injection (?Eylea) 3/1/24 by Alexis   - s/p PPV/ABx 3/3/24   - s/p ABx in clinic 3/5/24   - s/p PPV/ABx 3/7/24     - likely resolved        #  H/o aqueous misdirection OD   - likely secondary to endophthalmitis   - s/p PPV 3/3/24   - IOP good today, chamber deep      # Wet AMD OD   - last injection 3/1/24   - monitor for now        # Wet AMD OS   - injected with Eylea by Dr. Espinoza   - last njection OU  3/1/24 per patient   - per patient on 7 week interval   - next planned injection with Espinoza 4/19/24 already scheduled   - wishes to transfer to [facility]   - will consider PRN approach OS      # Syneresis OS      # Pseudophakia OU      Return in about 2 weeks (around 4/8/2024) for DFE OD, US OD, Optos Photo.       ATTESTATION     Attending Attestation:     Complete documentation of historical and exam elements from today's encounter can be found in the full encounter summary report (not reduplicated in this progress note).  I personally obtained the chief complaint(s) and history of present illness.  I confirmed and edited as necessary the review of systems, past medical/surgical history, family history, social history, and examination findings as documented by others; and I examined the patient myself.  I personally reviewed the relevant tests, images, and reports as documented above.  I formulated and edited as necessary the assessment and plan and discussed the findings and management plan with the patient and family    Hallie Isabel MD, PhD  , Vitreoretinal Surgery  Department of Ophthalmology  [facility]

## 2024-03-31 LAB
BACTERIA FLD CULT: NO GROWTH
BACTERIA FLD CULT: NO GROWTH

## 2024-04-02 DIAGNOSIS — H18.20 CORNEAL EDEMA OF RIGHT EYE: ICD-10-CM

## 2024-04-02 DIAGNOSIS — H43.391 VITREOUS OPACITIES OF RIGHT EYE: ICD-10-CM

## 2024-04-02 DIAGNOSIS — Z98.890 POST-OPERATIVE STATE: Primary | ICD-10-CM

## 2024-04-19 ENCOUNTER — OFFICE VISIT (OUTPATIENT)
Dept: OPHTHALMOLOGY | Facility: CLINIC | Age: 89
End: 2024-04-19
Attending: OPHTHALMOLOGY
Payer: MEDICARE

## 2024-04-19 DIAGNOSIS — H43.391 VITREOUS OPACITIES OF RIGHT EYE: ICD-10-CM

## 2024-04-19 DIAGNOSIS — Z98.890 POST-OPERATIVE STATE: ICD-10-CM

## 2024-04-19 PROCEDURE — 92134 CPTRZ OPH DX IMG PST SGM RTA: CPT | Performed by: OPHTHALMOLOGY

## 2024-04-19 PROCEDURE — 92250 FUNDUS PHOTOGRAPHY W/I&R: CPT | Mod: 59 | Performed by: OPHTHALMOLOGY

## 2024-04-19 PROCEDURE — 99024 POSTOP FOLLOW-UP VISIT: CPT | Performed by: OPHTHALMOLOGY

## 2024-04-19 PROCEDURE — G0463 HOSPITAL OUTPT CLINIC VISIT: HCPCS | Performed by: OPHTHALMOLOGY

## 2024-04-19 PROCEDURE — 99207 FUNDUS PHOTOS OU (BOTH EYES): CPT | Mod: 26 | Performed by: OPHTHALMOLOGY

## 2024-04-19 PROCEDURE — 99207 OCT RETINA SPECTRALIS OU (BOTH EYE): CPT | Mod: 26 | Performed by: OPHTHALMOLOGY

## 2024-04-19 PROCEDURE — 76512 OPH US DX B-SCAN: CPT | Performed by: OPHTHALMOLOGY

## 2024-04-19 PROCEDURE — 99207 ULTRASOUND B-SCAN OD (RIGHT EYE): CPT | Mod: 26 | Performed by: OPHTHALMOLOGY

## 2024-04-19 ASSESSMENT — VISUAL ACUITY
OS_CC: 20/60
OD_SC: LP
METHOD: SNELLEN - LINEAR
OS_CC+: -1

## 2024-04-19 ASSESSMENT — TONOMETRY
OD_IOP_MMHG: 03
IOP_METHOD: TONOPEN
IOP_METHOD: ICARE
OS_IOP_MMHG: 12

## 2024-04-19 ASSESSMENT — SLIT LAMP EXAM - LIDS: COMMENTS: NORMAL

## 2024-04-19 ASSESSMENT — EXTERNAL EXAM - RIGHT EYE: OD_EXAM: NORMAL

## 2024-04-19 NOTE — PROGRESS NOTES
CC -   Post Op    INTERVAL HISTORY -Pt states no new problems or concerns POW #6  No eye pain  Pt here with her son      PMH Joey Mejía is a  89 year old year-old patient with history of aqueous misdirection & endophthalmitis OD diagnosed 3/3/24, had high IOP, shallow chamber, and severe inflammation OD 3/3/24 after injection (?Eylea) OU by Dr. Espinoza 3/1/24      Vit Bx OD 3/3/24  Gram stain - 3+ g+ cocci,  3+ WBC  Aerobic Cx - 3+ strept oralis  Anaerobic Cx - NTD (3/11/24)  Fungal Cx - NTD (3/11/24)    Vit Bx OD 3/7/24  LYEDI - no organisms, cultures aerobic & anaerobic negative        PAST OCULAR SURGERY  CE/IOL OU ~ 2018  PPV/Vit Bx/ABx OD 3/3/24 ([facility])  PPV/vit Bx/ABx OD 3/7/24 (DDK)      RETINAL IMAGING:  U/S B-scan 03/25/24   OD - Total funnel ?partly closed at ON RD with mod serous choroidals largest nasal/temporal    OCT 04/19/24  OD - unable  OS - large GA, central subretinal deposits/drusen ?old CNVM      ASSESSMENT & PLAN    # POW #6  OD   - s/p PPV/ABx 3/7/24   - s/p PPV/ABx/Dex 3/3/24       - IOP low   - likley funnel RD and choroidals   - no new infection     - taper PF off 1/day then off   - continue  jennyfer 3/day to improve view     -Discussed options PPV/Endolaser/SO vs monitoring   -Pt prefers monitoring for now   - very uncertain vision prognosis given prior endophthalmitis   - unclear if would be able to attain ambulatory vision   - d/w patient      # RD OD   - suspicious on U/S 3/19/24   - unclear signifcance given LP vision    - uncertain vision potential, given prior  view  and limited RD would  have expected HM vision before   - worse today now funnel     - Plan as above        #  h/o Endophthalmitis OD   - Dx 3/3/24 after injection (?Eylea) 3/1/24 by Alexis   - s/p PPV/ABx 3/3/24   - s/p ABx in clinic 3/5/24   - s/p PPV/ABx 3/7/24     - likely resolved        #  H/o aqueous misdirection OD   - likely secondary to endophthalmitis   - s/p PPV 3/3/24   - IOP good today, chamber  deep      # Wet AMD OD   - last injection 3/1/24   - monitor for now        # Wet AMD OS   - injected with Eylea by Dr. Espinoza   - last njection OU 3/1/24 per patient   - per patient on 7 week interval   - next planned injection with Alexis 4/19/24   - wishes to transfer to [facility]     - OCT today no fluid, stable   - d/w patient, will do PRN approach for OS   - observe today   - recheck 4 weeks        # Syneresis OS      # Pseudophakia OU      Return in about 4 weeks (around 5/17/2024) for DFE OS, OCT OS, US OD.     Jaguar Johnson MD MPH  Vitreoretinal Fellow PGY-6  [facility]       ATTESTATION     Attending Attestation:     Complete documentation of historical and exam elements from today's encounter can be found in the full encounter summary report (not reduplicated in this progress note).  I personally obtained the chief complaint(s) and history of present illness.  I confirmed and edited as necessary the review of systems, past medical/surgical history, family history, social history, and examination findings as documented by others; and I examined the patient myself.  I personally reviewed the relevant tests, images, and reports as documented above.  I personally reviewed the ophthalmic test(s) associated with this encounter, agree with the interpretation(s) as documented by the resident/fellow, and have edited the corresponding report(s) as necessary.   I formulated and edited as necessary the assessment and plan and discussed the findings and management plan with the patient and family    Hallie Isabel MD, PhD  , Vitreoretinal Surgery  Department of Ophthalmology  [facility]

## 2024-04-19 NOTE — NURSING NOTE
Chief Complaints and History of Present Illnesses   Patient presents with    Post Op (Ophthalmology) Right Eye     PPV/Vit Bx/ABx OD 3/3/24 (HN)  PPV/vit Bx/ABx OD 3/7/24 (DDK)       Chief Complaint(s) and History of Present Illness(es)       Post Op (Ophthalmology) Right Eye              Comments: PPV/Vit Bx/ABx OD 3/3/24 (HN)  PPV/vit Bx/ABx OD 3/7/24 (DDK)                Comments    Pt states no new problems or concerns  No eye pain  Pt here with her son    Radha Alford COT 10:57 AM April 19, 2024

## 2024-04-30 DIAGNOSIS — H31.421: Primary | ICD-10-CM

## 2024-05-02 ENCOUNTER — TELEPHONE (OUTPATIENT)
Dept: OPHTHALMOLOGY | Facility: CLINIC | Age: 89
End: 2024-05-02
Payer: MEDICARE

## 2024-05-02 NOTE — TELEPHONE ENCOUNTER
[facility] Call Center    Phone Message    May a detailed message be left on voicemail: yes     Reason for Call: Symptoms or Concerns     If patient has red-flag symptoms, warm transfer to triage line    Current symptom or concern: RT eye pain/pressure      Symptoms have been present for:  2-3 week(s)    Has patient previously been seen for this? No    By : Dr Isabel    Date: 04/19/2024    Are there any new or worsening symptoms? Yes: Gradual right eye pain for the last 2-3 weeks but last night was very painful. Patient would like to know if Dr Isabel can squeeze her on the schedule soon.    Please call patient back at [phone]. Thank you.    Action Taken: Message routed to:  [facility] Eye    Travel Screening: Not Applicable

## 2024-05-02 NOTE — TELEPHONE ENCOUNTER
Janna Mejía [phone]       Spoke to pt t 1110    Worsening eye pain around eye at times    Offered appt tomorrow with Dr Isabel at 0850 and pt accepts    Pt aware of date/time/location at [facility].    Abe Trejo RN 11:13 AM 05/02/24

## 2024-05-03 ENCOUNTER — OFFICE VISIT (OUTPATIENT)
Dept: OPHTHALMOLOGY | Facility: CLINIC | Age: 89
End: 2024-05-03
Attending: OPHTHALMOLOGY
Payer: MEDICARE

## 2024-05-03 DIAGNOSIS — H35.3221 EXUDATIVE AGE-RELATED MACULAR DEGENERATION OF LEFT EYE WITH ACTIVE CHOROIDAL NEOVASCULARIZATION (H): ICD-10-CM

## 2024-05-03 DIAGNOSIS — H31.421: Primary | ICD-10-CM

## 2024-05-03 PROCEDURE — 76512 OPH US DX B-SCAN: CPT | Performed by: OPHTHALMOLOGY

## 2024-05-03 PROCEDURE — 92134 CPTRZ OPH DX IMG PST SGM RTA: CPT | Performed by: OPHTHALMOLOGY

## 2024-05-03 PROCEDURE — G0463 HOSPITAL OUTPT CLINIC VISIT: HCPCS | Performed by: OPHTHALMOLOGY

## 2024-05-03 PROCEDURE — 99024 POSTOP FOLLOW-UP VISIT: CPT | Performed by: OPHTHALMOLOGY

## 2024-05-03 RX ORDER — PREDNISOLONE ACETATE 10 MG/ML
1-2 SUSPENSION/ DROPS OPHTHALMIC 4 TIMES DAILY
Qty: 10 ML | Refills: 3 | Status: SHIPPED | OUTPATIENT
Start: 2024-05-03 | End: 2024-05-31

## 2024-05-03 RX ORDER — ATROPINE SULFATE 10 MG/ML
1 SOLUTION/ DROPS OPHTHALMIC DAILY
Qty: 5 ML | Refills: 11 | Status: SHIPPED | OUTPATIENT
Start: 2024-05-03

## 2024-05-03 ASSESSMENT — VISUAL ACUITY
OS_SC: 20/70
OS_SC+: -2
OD_SC: NLP
METHOD: SNELLEN - LINEAR

## 2024-05-03 ASSESSMENT — TONOMETRY
OS_IOP_MMHG: 08
IOP_METHOD: ICARE
OD_IOP_MMHG: 04

## 2024-05-03 ASSESSMENT — EXTERNAL EXAM - RIGHT EYE: OD_EXAM: NORMAL

## 2024-05-03 ASSESSMENT — CONF VISUAL FIELD
OD_INFERIOR_TEMPORAL_RESTRICTION: 1
OD_SUPERIOR_TEMPORAL_RESTRICTION: 1
OD_SUPERIOR_NASAL_RESTRICTION: 1
OD_INFERIOR_NASAL_RESTRICTION: 1

## 2024-05-03 ASSESSMENT — SLIT LAMP EXAM - LIDS: COMMENTS: NORMAL

## 2024-05-03 NOTE — PROGRESS NOTES
CC -   Post Op    INTERVAL HISTORY - worsening pain since last weekend when stopped PF gtt        PMH -   Janna Cris Mejía is a  89 year old year-old patient with history of aqueous misdirection & endophthalmitis OD diagnosed 3/3/24, had high IOP, shallow chamber, and severe inflammation OD 3/3/24 after injection (?Eylea) OU by Dr. 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      ASSESSMENT & PLAN    # POM #2  OD   - s/p PPV/ABx 3/7/24   - s/p PPV/ABx/Dex 3/3/24       - IOP low   - likley funnel RD and choroidals   - no new infection     - worsening pain today 5/3/24   - increased inflammation no infection likely   - NLP today per tech     - start PF 4/day & atropine 1/day   - if not improved may need enucleation       # RD OD   - suspicious on U/S 3/19/24   - unclear signifcance given LP vision    - uncertain vision potential, given prior  view  and limited RD would  have expected HM vision before   - worse today now funnel     - Plan as above        #  h/o Endophthalmitis OD   - Dx 3/3/24 after injection (?Eylea) 3/1/24 by Alexis   - s/p PPV/ABx 3/3/24   - s/p ABx in clinic 3/5/24   - s/p PPV/ABx 3/7/24     - likely resolved        #  H/o aqueous misdirection OD   - likely secondary to endophthalmitis   - s/p PPV 3/3/24   - IOP good today, chamber deep      # Wet AMD OD   - last injection 3/1/24   - monitor for now        # Wet AMD OS   - injected with Eylea by Dr. Espinoza   -  last njection OU 3/1/24 per patient   - per patient on 7 week interval   - next planned injection with Espinoza 4/19/24   - wishes to transfer to [facility]     - OCT today no fluid, stable   - d/w patient, will do PRN approach for OS   - observe today   - recheck 4 weeks        # Syneresis OS      # Pseudophakia OU      Return in about 2 weeks (around 5/17/2024) for OCT OS, DFE OS.           ATTESTATION     Attending Attestation:     Complete documentation of historical and exam elements from today's encounter can be found in the full encounter summary report (not reduplicated in this progress note).  I personally obtained the chief complaint(s) and history of present illness.  I confirmed and edited as necessary the review of systems, past medical/surgical history, family history, social history, and examination findings as documented by others; and I examined the patient myself.  I personally reviewed the relevant tests, images, and reports as documented above.  I personally reviewed the ophthalmic test(s) associated with this encounter, agree with the interpretation(s) as documented by the resident/fellow, and have edited the corresponding report(s) as necessary.   I formulated and edited as necessary the assessment and plan and discussed the findings and management plan with the patient and family    Hallie Isabel MD, PhD  , Vitreoretinal Surgery  Department of Ophthalmology  [facility]

## 2024-05-03 NOTE — NURSING NOTE
Chief Complaints and History of Present Illnesses   Patient presents with    Eye Pain Right Eye     Chief Complaint(s) and History of Present Illness(es)       Eye Pain Right Eye               Comments    Pt continuing to have eye pain in RE. Pt feeling Pressure and throbbing in RE that comes and goes. Pt reports eye pain last night, pt took tylenol for relief.  No flashes or floaters. No DM.    SUJIT Ortiz May 3, 2024 8:48 AM

## 2024-05-17 ENCOUNTER — OFFICE VISIT (OUTPATIENT)
Dept: OPHTHALMOLOGY | Facility: CLINIC | Age: 89
End: 2024-05-17
Attending: OPHTHALMOLOGY
Payer: MEDICARE

## 2024-05-17 DIAGNOSIS — H35.3221 EXUDATIVE AGE-RELATED MACULAR DEGENERATION OF LEFT EYE WITH ACTIVE CHOROIDAL NEOVASCULARIZATION (H): Primary | ICD-10-CM

## 2024-05-17 DIAGNOSIS — Z98.890 POST-OPERATIVE STATE: ICD-10-CM

## 2024-05-17 PROCEDURE — 99024 POSTOP FOLLOW-UP VISIT: CPT | Mod: GC | Performed by: OPHTHALMOLOGY

## 2024-05-17 PROCEDURE — 92134 CPTRZ OPH DX IMG PST SGM RTA: CPT | Mod: 26 | Performed by: OPHTHALMOLOGY

## 2024-05-17 PROCEDURE — G0463 HOSPITAL OUTPT CLINIC VISIT: HCPCS | Performed by: OPHTHALMOLOGY

## 2024-05-17 PROCEDURE — 92134 CPTRZ OPH DX IMG PST SGM RTA: CPT | Performed by: OPHTHALMOLOGY

## 2024-05-17 ASSESSMENT — VISUAL ACUITY
OS_SC: 20/125
OD_SC: NLP
METHOD: SNELLEN - LINEAR
OS_CC+: -1
OS_CC: 20/70
OS_SC+: -1

## 2024-05-17 ASSESSMENT — CUP TO DISC RATIO: OS_RATIO: 0.2

## 2024-05-17 ASSESSMENT — SLIT LAMP EXAM - LIDS
COMMENTS: NORMAL
COMMENTS: NORMAL

## 2024-05-17 ASSESSMENT — CONF VISUAL FIELD
OD_SUPERIOR_TEMPORAL_RESTRICTION: 1
OD_SUPERIOR_NASAL_RESTRICTION: 1
OD_INFERIOR_TEMPORAL_RESTRICTION: 1
OD_INFERIOR_NASAL_RESTRICTION: 1

## 2024-05-17 ASSESSMENT — EXTERNAL EXAM - RIGHT EYE: OD_EXAM: NORMAL

## 2024-05-17 ASSESSMENT — TONOMETRY
OD_IOP_MMHG: 4
IOP_METHOD: TONOPEN
OS_IOP_MMHG: 13

## 2024-05-17 ASSESSMENT — EXTERNAL EXAM - LEFT EYE: OS_EXAM: NORMAL

## 2024-05-17 NOTE — NURSING NOTE
Chief Complaints and History of Present Illnesses   Patient presents with    Choroidal Detachment Follow Up     Chief Complaint(s) and History of Present Illness(es)       Choroidal Detachment Follow Up              Laterality: left eye    Associated symptoms: tearing.  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 Today she states vision seems about the same as last visit. She sometime feels pain behind right eye.    Sammy Cunningham COT 8:24 AM May 17, 2024

## 2024-05-17 NOTE — PROGRESS NOTES
CC -   Post Op    INTERVAL HISTORY - POM#2 S/P PPV anterior chamber washout, reports that pain has subsided. No redness, irritation or foreign body sensation      [facility] -   Janna Mejía is a  89 year old year-old patient with history of aqueous misdirection & endophthalmitis OD diagnosed 3/3/24, had high IOP, shallow chamber, and severe inflammation OD 3/3/24 after injection (?Eylea) OU by Dr. 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- stable      ASSESSMENT & PLAN    # POM #2  OD   - s/p PPV/ABx 3/7/24   - s/p PPV/ABx/Dex 3/3/24       - IOP low   - likley funnel RD and choroidals   - no new infection     - Resolved pain 05/17/24    - increased inflammation no infection likely   - NLP today per tech     - Currently on PF 4/day & atropine 1/day   - 5% saline drops 2x day      - reduce PF to TID x 2 weeks then BID ongoing   - continue atropine 1/day   - f/u 4 weeks            # RD OD   - suspicious on U/S 3/19/24   - unclear signifcance given LP vision    - uncertain vision potential, given prior  view  and limited RD would  have expected HM vision before   - worse today now funnel     - Plan as above        #  h/o Endophthalmitis OD   - Dx 3/3/24 after injection (?Eylea) 3/1/24 by Alexis   - s/p PPV/ABx 3/3/24   - s/p ABx in clinic 3/5/24   - s/p PPV/ABx 3/7/24     - likely resolved        #  H/o aqueous misdirection OD   - likely secondary to endophthalmitis   - s/p PPV  3/3/24   - IOP good today, chamber deep      # Wet AMD OD   - last injection 3/1/24 Eylea per patient   - monitor for now        # Wet AMD OS   - injected with Eylea by Dr. Espinoza   - per patient was on 7 week interval   - wishes to transfer to [facility]   - changed to PRN     - last injection Eylea  3/1/24 per patient (11 weeks)   - OCT today no fluid, stable   - VA stable   - DFE no heme   - recheck 4 weeks (PRN)     - low vision C/S 5/2024        # Syneresis OS   - advised S/Sx RD 5/2024      # Pseudophakia OU      Return in about 4 weeks (around 6/14/2024) for DFE OS, OCT OS.     Jonathan Valverde MD  PGY-5 Vitreo-retina surgery Fellow  Department of Ophthalmology   [facility]      ATTESTATION     Attending Attestation:     Complete documentation of historical and exam elements from today's encounter can be found in the full encounter summary report (not reduplicated in this progress note).  I personally obtained the chief complaint(s) and history of present illness.  I confirmed and edited as necessary the review of systems, past medical/surgical history, family history, social history, and examination findings as documented by others; and I examined the patient myself.  I personally reviewed the relevant tests, images, and reports as documented above.  I personally reviewed the ophthalmic test(s) associated with this encounter, agree with the interpretation(s) as documented by the resident/fellow, and have edited the corresponding report(s) as necessary.   I formulated and edited as necessary the assessment and plan and discussed the findings and management plan with the patient and family    Hallie Isabel MD, PhD  , Vitreoretinal Surgery  Department of Ophthalmology  [facility]

## 2024-05-27 ENCOUNTER — MYC MEDICAL ADVICE (OUTPATIENT)
Dept: OPHTHALMOLOGY | Facility: CLINIC | Age: 89
End: 2024-05-27
Payer: MEDICARE

## 2024-05-28 DIAGNOSIS — H35.3221 EXUDATIVE AGE-RELATED MACULAR DEGENERATION OF LEFT EYE WITH ACTIVE CHOROIDAL NEOVASCULARIZATION (H): Primary | ICD-10-CM

## 2024-05-28 NOTE — TELEPHONE ENCOUNTER
Spoke to Tonio at 1225 after images reviewed.    Concern of possible infection around eye/preseptal cellulitis.    Pt lives in [place] and would be hard to travel to [place] today.    Pt with  5-6 day history of swelling around eye and recommended seeing local eye provider or may proceed to Urgent care today for evaluation.    After review pt will likely go to urgent care for exam and Tonio/patient will message eye clinic with update on visit/recommendation for eye exam following urgent care visit.    Abe Trejo RN 12:33 PM 05/28/24

## 2024-05-31 ENCOUNTER — OFFICE VISIT (OUTPATIENT)
Dept: OPHTHALMOLOGY | Facility: CLINIC | Age: 89
End: 2024-05-31
Attending: OPTOMETRIST
Payer: MEDICARE

## 2024-05-31 DIAGNOSIS — H21.561: Primary | ICD-10-CM

## 2024-05-31 DIAGNOSIS — L03.213 PRESEPTAL CELLULITIS: ICD-10-CM

## 2024-05-31 PROCEDURE — 99203 OFFICE O/P NEW LOW 30 MIN: CPT | Mod: 24 | Performed by: OPTOMETRIST

## 2024-05-31 PROCEDURE — G0463 HOSPITAL OUTPT CLINIC VISIT: HCPCS | Performed by: OPTOMETRIST

## 2024-05-31 RX ORDER — AMOXICILLIN AND CLAVULANATE POTASSIUM 500; 125 MG/1; MG/1
1 TABLET, FILM COATED ORAL EVERY 12 HOURS
Qty: 14 TABLET | Refills: 0 | Status: SHIPPED | OUTPATIENT
Start: 2024-05-31

## 2024-05-31 RX ORDER — ERYTHROMYCIN 5 MG/G
0.5 OINTMENT OPHTHALMIC EVERY 12 HOURS
COMMUNITY
Start: 2024-05-28 | End: 2024-06-04

## 2024-05-31 RX ORDER — AMOXICILLIN AND CLAVULANATE POTASSIUM 500; 125 MG/1; MG/1
500 TABLET, FILM COATED ORAL EVERY 12 HOURS
COMMUNITY
Start: 2024-05-28 | End: 2024-05-31

## 2024-05-31 ASSESSMENT — VISUAL ACUITY
OD_SC: NLP
OS_CC: 20/60
OS_CC+: +2
OS_SC+: -1
METHOD: SNELLEN - LINEAR
OS_SC: 20/100

## 2024-05-31 ASSESSMENT — TONOMETRY
OS_IOP_MMHG: 10
IOP_METHOD: TONOPEN
OD_IOP_MMHG: 13

## 2024-05-31 ASSESSMENT — CONF VISUAL FIELD
OD_SUPERIOR_TEMPORAL_RESTRICTION: 1
OD_INFERIOR_NASAL_RESTRICTION: 1
OD_INFERIOR_TEMPORAL_RESTRICTION: 1
OD_SUPERIOR_NASAL_RESTRICTION: 1

## 2024-05-31 ASSESSMENT — SLIT LAMP EXAM - LIDS: COMMENTS: NORMAL

## 2024-05-31 ASSESSMENT — EXTERNAL EXAM - RIGHT EYE: OD_EXAM: NORMAL

## 2024-05-31 ASSESSMENT — CUP TO DISC RATIO: OS_RATIO: 0.2

## 2024-05-31 ASSESSMENT — EXTERNAL EXAM - LEFT EYE: OS_EXAM: NORMAL

## 2024-05-31 NOTE — PATIENT INSTRUCTIONS
Preseptal cellulitis  Warm compresses as needed  Ibuprofen 600 mgs for pain  Cont Amoxicillin for 14 days   Recheck next week with Dr. Gaspar

## 2024-05-31 NOTE — PROGRESS NOTES
Assessment & Plan       Janna Mejía is a 89 year old female with the following diagnoses:   1. Abnormal shape of right pupil - Right Eye    2. Preseptal cellulitis - Right Eye          Preseptal cellulitis  Warm compresses as needed  Ibuprofen 600 mgs for pain  Recheck next week with Dr. Gaspar    Patient disposition:   No follow-ups on file.          Complete documentation of historical and exam elements from today's encounter can be found in the full encounter summary report (not reduplicated in this progress note). I personally obtained the chief complaint(s) and history of present illness.  I confirmed and edited as necessary the review of systems, past medical/surgical history, family history, social history, and examination findings as documented by others; and I examined the patient myself. I personally reviewed the relevant tests, images, and reports as documented above. I formulated and edited as necessary the assessment and plan and discussed the findings and management plan with the patient and family.  Dr. Nathan Rodriguez

## 2024-05-31 NOTE — NURSING NOTE
Chief Complaints and History of Present Illnesses   Patient presents with    Cellulitis, Eyelid Evaluation     Chief Complaint(s) and History of Present Illness(es)       Cellulitis, Eyelid Evaluation              Laterality: right eye    Associated signs and symptoms: eye pain, discharge and tearing.  Negative for mattering and itching of lids    Course: gradually improving    Pain scale: 6/10              Comments    Janna is here to follow up on ED visit (Tuesday in [facility]) for evaluation of preseptal cellulitis. 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 She feels left eye is not affected.  She says the weekend before ED she says she had had a sinus cold then the pain started on the weekend with swelling. She has been usung Atropine for pver a month right eye prescribed by Dr LOBO, and is also NLP RE.     Sammy Cunningham COT 1:55 PM May 31, 2024     Sammy Cunningham COT 1:57 PM May 31, 2024

## 2024-06-03 ENCOUNTER — TELEPHONE (OUTPATIENT)
Dept: OPHTHALMOLOGY | Facility: CLINIC | Age: 89
End: 2024-06-03
Payer: MEDICARE

## 2024-06-03 NOTE — TELEPHONE ENCOUNTER
e met with Dr Rodriguez on Friday, 5/31.  His instructions were to get a recheck appointment with Dr Gaspar the week of  6/3.  I will be Janna's caretaker the week of 6/3.  Please schedule the appointment with Dr Gaspar and provide us the details.  Thanks,  Kathi Park  [phone]    --    Scheduled at 1230 on Wednesday/June 5th at 1230 PM at [facility] with Dr. Gaspar.    Confirmed appt with Kathi/pt and aware of date/time/location at [facility]    Abe Trejo RN 11:22 AM 06/03/24

## 2024-06-05 ENCOUNTER — OFFICE VISIT (OUTPATIENT)
Dept: OPHTHALMOLOGY | Facility: CLINIC | Age: 89
End: 2024-06-05
Attending: STUDENT IN AN ORGANIZED HEALTH CARE EDUCATION/TRAINING PROGRAM
Payer: MEDICARE

## 2024-06-05 DIAGNOSIS — H57.89 REDNESS OF RIGHT EYE: Primary | ICD-10-CM

## 2024-06-05 PROCEDURE — 99213 OFFICE O/P EST LOW 20 MIN: CPT | Performed by: STUDENT IN AN ORGANIZED HEALTH CARE EDUCATION/TRAINING PROGRAM

## 2024-06-05 PROCEDURE — G0463 HOSPITAL OUTPT CLINIC VISIT: HCPCS | Performed by: STUDENT IN AN ORGANIZED HEALTH CARE EDUCATION/TRAINING PROGRAM

## 2024-06-05 ASSESSMENT — CONF VISUAL FIELD
OS_INFERIOR_TEMPORAL_RESTRICTION: 0
OD_INFERIOR_NASAL_RESTRICTION: 1
OS_SUPERIOR_TEMPORAL_RESTRICTION: 0
OD_SUPERIOR_TEMPORAL_RESTRICTION: 1
OS_INFERIOR_NASAL_RESTRICTION: 0
OS_SUPERIOR_NASAL_RESTRICTION: 0
OD_SUPERIOR_NASAL_RESTRICTION: 1
OS_NORMAL: 1
OD_INFERIOR_TEMPORAL_RESTRICTION: 1

## 2024-06-05 ASSESSMENT — VISUAL ACUITY
METHOD: SNELLEN - LINEAR
OS_CC+: -1
CORRECTION_TYPE: GLASSES
OS_CC: 20/50
OD_CC: NLP

## 2024-06-05 ASSESSMENT — TONOMETRY
OD_IOP_MMHG: 02
OS_IOP_MMHG: 11
IOP_METHOD: ICARE

## 2024-06-05 ASSESSMENT — EXTERNAL EXAM - RIGHT EYE: OD_EXAM: NORMAL

## 2024-06-05 ASSESSMENT — EXTERNAL EXAM - LEFT EYE: OS_EXAM: NORMAL

## 2024-06-05 NOTE — LETTER
"6/5/2024       RE: Janna Mejía  [address] [place] [address]     Dear Colleague,    Thank you for referring your patient, Janna Mejía, to the [facility] [place] at [facility]. Please see a copy of my visit note below.    HPI       Cellulitis, Eyelid Follow Up    In right eye.             Comments    Pt. States that she is still having some pain and swelling RE but much improved. No change in VA BE.   Angela Subramanian COT 12:10 PM June 5, 2024             Last edited by Angela Subramanian on 6/5/2024 12:10 PM.          Review of systems for the eyes was negative other than the pertinent positives/negatives listed in the HPI.    Ocular Meds: predforte BID OD; atropine daily OD    Ocular Hx: wet AMD OU; RD OD, aqueous misdirection OD; endophthalmitis OD; NLP vision OD; pseudophakia OU    FOHx: no pertinent family ocular history    PMHx: HTN      Assessment & Plan      Janna Mejía is a 89 year old female with the following diagnoses:    History of Endophthalmitis OD  History of Retinal Detachment/Choroidals OD  History of Aqueous Misdirection OD  - history if intravitreal injection OD with subsequent development of strep oralis endophthalmitis  - underwent treatment including PPV/Abx  - retina felt infection resolved  - saw Dr Rodriguez for eye pain last week, today improved, but still some discomfort  - no obvious signs of infection today  - had extensive discussion, given blind painful eye, can consider evisceration/enucleation; provided resources to patient including book \"Eye Was There\" to review as this may be an alternative given patient being NLP OD; will review with Dr Isabel  - currently using Predforte BID will increase to Predforte 6x/day OD  - continue jennyfer 128 5% solution BID OD  - continue atropine daily OD  - if worsening, patient to come in sooner, may require Bscan; will see how patient " responds; patient is amenable to plan     Pseudophakia OU  - observe    Counseled return/RD precautions    Patient disposition:   Return for Follow Up Dr Isabel as scheduled, or sooner changes.    Attending Physician Attestation:  Complete documentation of historical and exam elements from today's encounter can be found in the full encounter summary report (not reduplicated in this progress note).  I personally obtained the chief complaint(s) and history of present illness.  I confirmed and edited as necessary the review of systems, past medical/surgical history, family history, social history, and examination findings as documented by others; and I examined the patient myself.  I personally reviewed the relevant tests, images, and reports as documented above.  I formulated and edited as necessary the assessment and plan and discussed the findings and management plan with the patient and family. . - Brenda Gaspar MD        Again, thank you for allowing me to participate in the care of your patient.      Sincerely,    Brenda Gaspar MD

## 2024-06-05 NOTE — PROGRESS NOTES
"HPI       Cellulitis, Eyelid Follow Up    In right eye.             Comments    Pt. States that she is still having some pain and swelling RE but much improved. No change in VA BE.   Angela Subarmanian COT 12:10 PM June 5, 2024             Last edited by Angela Subramanian on 6/5/2024 12:10 PM.          Review of systems for the eyes was negative other than the pertinent positives/negatives listed in the HPI.    Ocular Meds: predforte BID OD; atropine daily OD    Ocular Hx: wet AMD OU; RD OD, aqueous misdirection OD; endophthalmitis OD; NLP vision OD; pseudophakia OU    FOHx: no pertinent family ocular history    PMHx: HTN      Assessment & Plan      Janna Mejía is a 89 year old female with the following diagnoses:    History of Endophthalmitis OD  History of Retinal Detachment/Choroidals OD  History of Aqueous Misdirection OD  - history if intravitreal injection OD with subsequent development of strep oralis endophthalmitis  - underwent treatment including PPV/Abx  - retina felt infection resolved  - saw Dr Rodriguez for eye pain last week, today improved, but still some discomfort  - no obvious signs of infection today  - had extensive discussion, given blind painful eye, can consider evisceration/enucleation; provided resources to patient including book \"Eye Was There\" to review as this may be an alternative given patient being NLP OD; will review with Dr Isabel  - currently using Predforte BID will increase to Predforte 6x/day OD  - continue jennyfer 128 5% solution BID OD  - continue atropine daily OD  - if worsening, patient to come in sooner, may require Bscan; will see how patient responds; patient is amenable to plan     Pseudophakia OU  - observe    Counseled return/RD precautions    Patient disposition:   Return for Follow Up Dr Isabel as scheduled, or sooner changes.    Attending Physician Attestation:  Complete documentation of historical and exam elements from today's encounter can be found in the " full encounter summary report (not reduplicated in this progress note).  I personally obtained the chief complaint(s) and history of present illness.  I confirmed and edited as necessary the review of systems, past medical/surgical history, family history, social history, and examination findings as documented by others; and I examined the patient myself.  I personally reviewed the relevant tests, images, and reports as documented above.  I formulated and edited as necessary the assessment and plan and discussed the findings and management plan with the patient and family. . - Brenda Gaspar MD

## 2024-06-05 NOTE — NURSING NOTE
Chief Complaints and History of Present Illnesses   Patient presents with    Cellulitis, Eyelid Follow Up     Chief Complaint(s) and History of Present Illness(es)       Cellulitis, Eyelid Follow Up              Laterality: right eye              Comments    Pt. States that she is still having some pain and swelling RE but much improved. No change in VA BE.   Angela Subramanian COT 12:10 PM June 5, 2024

## 2024-06-14 ENCOUNTER — OFFICE VISIT (OUTPATIENT)
Dept: OPHTHALMOLOGY | Facility: CLINIC | Age: 89
End: 2024-06-14
Attending: OPHTHALMOLOGY
Payer: MEDICARE

## 2024-06-14 DIAGNOSIS — H35.3221 EXUDATIVE AGE-RELATED MACULAR DEGENERATION OF LEFT EYE WITH ACTIVE CHOROIDAL NEOVASCULARIZATION (H): ICD-10-CM

## 2024-06-14 PROCEDURE — 92134 CPTRZ OPH DX IMG PST SGM RTA: CPT | Performed by: OPHTHALMOLOGY

## 2024-06-14 PROCEDURE — 99214 OFFICE O/P EST MOD 30 MIN: CPT | Mod: GC | Performed by: OPHTHALMOLOGY

## 2024-06-14 PROCEDURE — G0463 HOSPITAL OUTPT CLINIC VISIT: HCPCS | Performed by: OPHTHALMOLOGY

## 2024-06-14 ASSESSMENT — VISUAL ACUITY
OS_CC: 20/50
METHOD: SNELLEN - LINEAR
OD_CC: NLP
OS_CC+: +/-1

## 2024-06-14 ASSESSMENT — CONF VISUAL FIELD
OD_INFERIOR_NASAL_RESTRICTION: 1
OS_SUPERIOR_NASAL_RESTRICTION: 2
OS_INFERIOR_NASAL_RESTRICTION: 2
OD_SUPERIOR_TEMPORAL_RESTRICTION: 1
OD_SUPERIOR_NASAL_RESTRICTION: 1
OS_SUPERIOR_TEMPORAL_RESTRICTION: 2
OS_INFERIOR_TEMPORAL_RESTRICTION: 2
OD_INFERIOR_TEMPORAL_RESTRICTION: 1

## 2024-06-14 ASSESSMENT — TONOMETRY
OS_IOP_MMHG: 12
IOP_METHOD: ICARE
OD_IOP_MMHG: 5

## 2024-06-14 ASSESSMENT — CUP TO DISC RATIO
OD_RATIO: NO VIEW
OS_RATIO: 0.2

## 2024-06-14 ASSESSMENT — EXTERNAL EXAM - LEFT EYE: OS_EXAM: NORMAL

## 2024-06-14 ASSESSMENT — EXTERNAL EXAM - RIGHT EYE: OD_EXAM: NORMAL

## 2024-06-14 NOTE — PROGRESS NOTES
CC -   Post Op month 3 s/p PPV anterior chamber washout     INTERVAL HISTORY - POM#3 S/P PPV anterior chamber washout. Patient reports that she had random episodes of pain in her right eye.   bothered      [facility] -   Janna Mejía is a  89 year old year-old patient with history of aqueous misdirection & endophthalmitis OD diagnosed 3/3/24, had high IOP, shallow chamber, and severe inflammation OD 3/3/24 after injection (?Eylea) OU by Dr. 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 Espinoza   - per patient was on 7 week interval   - wishes to transfer to [facility]   - changed to PRN     - last injection Eylea  3/1/24 per patient (11 weeks)   - OCT today no fluid, stable   - VA stable   - DFE no heme   - recheck 4-6 weeks (PRN)     - low vision appointment on 6/28/2024      # Blind painful eye right eye, NLP   - prior endophthalmitis   - now choroidals and RD   - Episodes of pain in her right eye  - Interested on having an appointment with oculoplastics for enucleation vs evisceration evaluation       - recurrent pain 6/2024 increased PF to 6/day   - better today   - stop atropine & jennyfer   - reduce PF to 3/day       #  h/o Endophthalmitis OD   - Dx 3/3/24 after injection (?Eylea) 3/1/24 by Alexis   - s/p PPV/ABx 3/3/24   - s/p ABx in clinic 3/5/24   - s/p PPV/ABx 3/7/24            # RD OD   - suspicious on U/S  3/19/24   - unclear signifcance given NLP vision    - uncertain vision potential, given prior  view  and limited RD would  have expected HM vision before   - worse today now funnel     - Plan as above                #  H/o aqueous misdirection OD   - likely secondary to endophthalmitis   - s/p PPV 3/3/24   - IOP low today, chamber deep      # Wet AMD OD   - last injection 3/1/24 Eylea per patient   - monitor for now          # Syneresis OS   - advised S/Sx RD 5/2024      # Pseudophakia OU      Return in about 6 weeks (around 7/26/2024) for DFE OS, OCT OS.     Jonathan Valverde MD  PGY-5 Vitreo-retina surgery Fellow  Department of Ophthalmology   [facility]      ATTESTATION     Attending Attestation:     Complete documentation of historical and exam elements from today's encounter can be found in the full encounter summary report (not reduplicated in this progress note).  I personally obtained the chief complaint(s) and history of present illness.  I confirmed and edited as necessary the review of systems, past medical/surgical history, family history, social history, and examination findings as documented by others; and I examined the patient myself.  I personally reviewed the relevant tests, images, and reports as documented above.  I personally reviewed the ophthalmic test(s) associated with this encounter, agree with the interpretation(s) as documented by the resident/fellow, and have edited the corresponding report(s) as necessary.   I formulated and edited as necessary the assessment and plan and discussed the findings and management plan with the patient and family    Hallie Isabel MD, PhD  , Vitreoretinal Surgery  Department of Ophthalmology  [facility]

## 2024-06-14 NOTE — NURSING NOTE
Chief Complaints and History of Present Illnesses   Patient presents with    Follow Up     Chief Complaint(s) and History of Present Illness(es)       Follow Up              Laterality: both eyes    Onset: 1 month ago    Severity: mild    Frequency: constantly              Comments    Vision right eye is stable and left eye is stable.  Right eye has pain.  It was improved but then got another infection.  This pain has been stable since onset.  Denies new flashes or floaters.  Occasional redness. Prednisolone 5-6 times a day, saline twice a day, cream twice a day, and atropine once a day.      Betty Shaw on 6/14/2024 at 9:54 AM

## 2024-06-17 NOTE — TELEPHONE ENCOUNTER
FUTURE VISIT INFORMATION      FUTURE VISIT INFORMATION:  Date: 9/9/24  Time: 9:30am  Location: [facility]  REFERRAL INFORMATION:  Referring provider:  Hallie Isabel MD   Referring providers clinic:  [facility]  Reason for visit/diagnosis  enucleation vs evisceration evaluation     RECORDS REQUESTED FROM:       Clinic name Comments Records Status Imaging Status   [facility] Ov/referral 6/14/24  Ov/notes 6/14/24-3/3/34 EPIC    Imaging CT Orbits done 5/28/24- [facility]- requested image be pushed

## 2024-06-18 ENCOUNTER — TELEPHONE (OUTPATIENT)
Dept: OPHTHALMOLOGY | Facility: CLINIC | Age: 89
End: 2024-06-18
Payer: MEDICARE

## 2024-06-18 NOTE — TELEPHONE ENCOUNTER
Patient confirmed rescheduled sooner appointment:  Date: 6/20/24  Time: 10:00am  Visit type: NEW PLASTICS  Provider:   Location: [facility]   Testing/imaging: In EPIC  Additional notes: Oculoplastics for enucleation vs evisceration evaluation-Per -Appt Per PT's Son     Provided appointment details over the phone for patient's son.-Per PT's Son, Kang

## 2024-06-18 NOTE — TELEPHONE ENCOUNTER
FUTURE VISIT INFORMATION      FUTURE VISIT INFORMATION:  Date: 6/20/2024  Time: 10 AM  Location: [facility]  REFERRAL INFORMATION:  Referring provider:  Dr. Isabel  Referring providers clinic:  [facility]  Reason for visit/diagnosis enucleation vs evisceration evaluation    RECORDS REQUESTED FROM:         Clinic name Comments Records Status Imaging Status   [facility] Ov/referral 6/14/24  Ov/notes 6/14/24-3/3/34 EPIC     Imaging CT Orbits done 5/28/24- [facility]- requested image be pushed  Care Everywhere In PACs

## 2024-06-20 ENCOUNTER — PRE VISIT (OUTPATIENT)
Dept: OPHTHALMOLOGY | Facility: CLINIC | Age: 89
End: 2024-06-20

## 2024-06-20 ENCOUNTER — OFFICE VISIT (OUTPATIENT)
Dept: OPHTHALMOLOGY | Facility: CLINIC | Age: 89
End: 2024-06-20
Payer: MEDICARE

## 2024-06-20 ENCOUNTER — TELEPHONE (OUTPATIENT)
Dept: OPHTHALMOLOGY | Facility: CLINIC | Age: 89
End: 2024-06-20

## 2024-06-20 DIAGNOSIS — H54.40 BLIND PAINFUL RIGHT EYE: Primary | ICD-10-CM

## 2024-06-20 DIAGNOSIS — H44.001 RIGHT ENDOPHTHALMIA: ICD-10-CM

## 2024-06-20 DIAGNOSIS — H57.11 BLIND PAINFUL RIGHT EYE: Primary | ICD-10-CM

## 2024-06-20 PROCEDURE — 99214 OFFICE O/P EST MOD 30 MIN: CPT | Mod: GC | Performed by: OPHTHALMOLOGY

## 2024-06-20 ASSESSMENT — CONF VISUAL FIELD
OS_SUPERIOR_NASAL_RESTRICTION: 0
METHOD: COUNTING FINGERS
OS_INFERIOR_NASAL_RESTRICTION: 0
OS_INFERIOR_TEMPORAL_RESTRICTION: 0
OD_SUPERIOR_NASAL_RESTRICTION: 1
OS_NORMAL: 1
OD_SUPERIOR_TEMPORAL_RESTRICTION: 1
OD_INFERIOR_NASAL_RESTRICTION: 1
OD_INFERIOR_TEMPORAL_RESTRICTION: 1
OS_SUPERIOR_TEMPORAL_RESTRICTION: 0

## 2024-06-20 ASSESSMENT — TONOMETRY
OD_IOP_MMHG: 8
OS_IOP_MMHG: 6
IOP_METHOD: ICARE

## 2024-06-20 ASSESSMENT — VISUAL ACUITY
METHOD: SNELLEN - LINEAR
OD_CC: NLP
OS_CC: 20/60

## 2024-06-20 ASSESSMENT — EXTERNAL EXAM - RIGHT EYE: OD_EXAM: NORMAL

## 2024-06-20 ASSESSMENT — EXTERNAL EXAM - LEFT EYE: OS_EXAM: NORMAL

## 2024-06-20 NOTE — NURSING NOTE
Chief Complaints and History of Present Illnesses   Patient presents with    Eye Pain Right Eye     Chief Complaint(s) and History of Present Illness(es)       Eye Pain Right Eye              Laterality: In right eye              Comments    Janna Mejía is being seen for a consult today by the request of Dr. Isabel for eye pain and consideration of evisceration versus nucleation.  Pain is constant and aggravating.      Betty Shaw on 6/20/2024 at 9:53 AM

## 2024-06-20 NOTE — PROGRESS NOTES
Chief Complaint(s) and History of Present Illness(es)     Eye Pain Right Eye            Laterality: In right eye    Quality: aching    Pain scale: 5/10    Frequency: constantly    Duration: 3 months    Course: stable          Comments    Janna Mejía is being seen for a consult today by the request   of Dr. Isabel for eye pain and consideration of evisceration versus   nucleation.  Pain is constant and aggravating.  Has some mattering.      Betty Shaw on 6/20/2024 at 9:53 AM    From retina:  Vit Bx OD 3/3/24  Gram stain - 3+ g+ cocci,  3+ WBC  Aerobic Cx - 3+ strept oralis  Anaerobic Cx - NTD (3/11/24)  Fungal Cx - NTD (3/11/24)     Vit Bx OD 3/7/24  LEYDI - no organisms, cultures aerobic & anaerobic negative        PAST OCULAR SURGERY  CE/IOL OU ~ 2018  PPV/Vit Bx/ABx OD 3/3/24 ([facility])  PPV/vit Bx/ABx OD 3/7/24 ([facility])        RETINAL IMAGING:  U/S B-scan 03/25/24   OD - Total funnel ?partly closed at ON RD with large serous choroidals largest nasal/temporal     OCT 06/14/2024  OD - unable  OS - large GA, central subretinal deposits/drusen ?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    Blind painful right eye Yes    Right endophthalmia        Patient here for consideration of enucleation/evisceration due to blind painful right. History of endophthalmitis in right eye in past and now with episodes of pain. Is interested in eye removal if it will remove the pain. No anticoagulation, heart, or lung issues. CT orbit from 5/31/24 with signs of periorbital without retroseptal extension. Has been treated and no signs of cellulitis on exam today.     Plan:  - Right evisceration with implant.       Patient disposition:   No follow-ups on file.     Meet Whitlock MD  PGY-2 Ophthalmology Resident         Attending Physician Attestation: Complete documentation of historical and exam elements  from today's encounter can be found in the full encounter summary report (not reduplicated in this progress note). I personally obtained the chief complaint(s) and history of present illness. I confirmed and edited as necessary the review of systems, past medical/surgical history, family history, social history, and examination findings as documented by others; and I examined the patient myself. I personally reviewed the relevant tests, images, and reports as documented above. I formulated and edited as necessary the assessment and plan and discussed the findings and management plan with the patient.  -Sam Givens MD      Today with Janna Mejía and two of her sons, I reviewed the indications, risks, benefits, and alternatives of the proposed surgical procedure including, but not limited to, failure obtain the desired result and need for additional surgery, bleeding, infection, and the remote possibility of permanent damage to any organ system or death with the use of anesthesia. With implants, there is always a risk of implant related complications including exposure, extrusion, infection, and need for more surgery or medications. I provided multiple opportunities for the questions, answered all questions to the best of my ability, and confirmed that my answers and my discussion were understood.

## 2024-06-20 NOTE — LETTER
2024         RE:  :  MRN: Janna Mejía  10/8/1932  [id number]     Dear Dr. Hallie Isabel,    Thank you for asking me to see your patient, Janna Mejía, for an oculoplastic   consultation.  My assessment and plan are below.  For further details, please see my attached clinic note.      Chief Complaint(s) and History of Present Illness(es)     Eye Pain Right Eye            Laterality: In right eye    Quality: aching    Pain scale: 5/10    Frequency: constantly    Duration: 3 months    Course: stable          Comments    Janna Mejía is being seen for a consult today by the request   of Dr. Isabel for eye pain and consideration of evisceration versus   nucleation.  Pain is constant and aggravating.  Has some mattering.      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    Blind painful right eye Yes    Right endophthalmia        Patient here for consideration of enucleation/evisceration due to blind painful right. History of endophthalmitis in right eye in past and now with episodes of pain. Is interested in eye removal if it will remove the pain. No anticoagulation, heart, or lung issues. CT orbit from 24 with signs of periorbital without  retroseptal extension. Has been treated and no signs of cellulitis on exam today.     Plan:  - Right evisceration with implant.       Patient disposition:   No follow-ups on file.     Meet Whitlock MD  PGY-2 Ophthalmology Resident          Again, thank you for allowing me to participate in the care of your patient.      Sincerely,    Sam Givens MD  Department of Ophthalmology and Visual Neurosciences  [facility]    CC: Carolyn Corbett MD  [address] [place] [id number]  Via Fax: [phone]     Hallie Isabel MD  [address] [place] [id number]  Via In Basket

## 2024-06-21 ENCOUNTER — HOSPITAL ENCOUNTER (OUTPATIENT)
Facility: AMBULATORY SURGERY CENTER | Age: 89
End: 2024-06-21
Attending: OPHTHALMOLOGY
Payer: MEDICARE

## 2024-06-21 PROBLEM — H54.40 BLIND PAINFUL RIGHT EYE: Status: ACTIVE | Noted: 2024-06-20

## 2024-06-21 PROBLEM — H44.001 RIGHT ENDOPHTHALMIA: Status: ACTIVE | Noted: 2024-06-20

## 2024-06-21 PROBLEM — H57.11 BLIND PAINFUL RIGHT EYE: Status: ACTIVE | Noted: 2024-06-20

## 2024-06-21 NOTE — TELEPHONE ENCOUNTER
Called patient to schedule surgery with Dr. Givens    Spoke with: Janna and son Pedro    Date of Surgery: 7-12-24     Patient aware of approximate arrival time: No at Pt to get a call a couple of days prior to surgery     Location of surgery: [facility]     Pre-Op H&P: [facility]     Informed patient that they need to call to schedule pre-op H&P with PCP within 30 days of surgery date: Yes    Post-Op Appt Dates: 7-16-24 1:00pm     Discussed with patient pre-op RN will call 2-3 days prior to surgery with arrival time and instructions:  Yes    Discussed with patient PAC RN will provide arrival time and instructions for surgery at the time of the appointment: [[place] locations only]: No      Standard Surgery Packet Sent: Yes 06/21/24  via Mail - Standard      Surgical Soap Discussed with Patient: Yes  - Received:  Local Pharmacy       Additional Information Sent in Packet:          Informed patient that they will need an adult  to bring patient home from surgery: Yes  : Son         Additional Comments:        All patients questions were answered and was instructed to review surgical packet and call back [phone] with any questions or concerns.       Lula Malcolm on 6/21/2024 at 3:31 PM

## 2024-06-21 NOTE — TELEPHONE ENCOUNTER
Message left for the patient to call back to get surgery scheduled with Dr. Givens.     Lula Malcolm  Surgery Scheduling Coordinator  Ph: [phone]

## 2024-07-07 RX ORDER — CEFAZOLIN SODIUM 2 G/50ML
2 SOLUTION INTRAVENOUS
Status: CANCELLED | OUTPATIENT
Start: 2024-07-07

## 2024-07-08 NOTE — TELEPHONE ENCOUNTER
Received call from patient's son to cancel surgery as the patient has not had any pain for the last several days. She would like to wait until after her appt with Dr. Isabel in August and then decide if she would like the surgery going forward. Surgery and post op appointment have been cancelled.     Lula Malcolm  Surgery Scheduling Coordinator  Ph: [phone]

## 2024-07-17 DIAGNOSIS — H35.3221 EXUDATIVE AGE-RELATED MACULAR DEGENERATION OF LEFT EYE WITH ACTIVE CHOROIDAL NEOVASCULARIZATION (H): Primary | ICD-10-CM

## 2024-08-02 ENCOUNTER — OFFICE VISIT (OUTPATIENT)
Dept: OPHTHALMOLOGY | Facility: CLINIC | Age: 89
End: 2024-08-02
Attending: OPHTHALMOLOGY
Payer: MEDICARE

## 2024-08-02 DIAGNOSIS — H35.3221 EXUDATIVE AGE-RELATED MACULAR DEGENERATION OF LEFT EYE WITH ACTIVE CHOROIDAL NEOVASCULARIZATION (H): ICD-10-CM

## 2024-08-02 PROCEDURE — G0463 HOSPITAL OUTPT CLINIC VISIT: HCPCS | Performed by: OPHTHALMOLOGY

## 2024-08-02 PROCEDURE — 99213 OFFICE O/P EST LOW 20 MIN: CPT | Mod: GC | Performed by: OPHTHALMOLOGY

## 2024-08-02 PROCEDURE — 92134 CPTRZ OPH DX IMG PST SGM RTA: CPT | Performed by: OPHTHALMOLOGY

## 2024-08-02 ASSESSMENT — VISUAL ACUITY
OS_SC: 20/80
OS_SC+: -1
OD_CC: NLP
METHOD: SNELLEN - LINEAR

## 2024-08-02 ASSESSMENT — EXTERNAL EXAM - LEFT EYE: OS_EXAM: NORMAL

## 2024-08-02 ASSESSMENT — TONOMETRY
OD_IOP_MMHG: UNABLE
OS_IOP_MMHG: 12
IOP_METHOD: TONOPEN

## 2024-08-02 ASSESSMENT — CUP TO DISC RATIO: OS_RATIO: 0.2

## 2024-08-02 ASSESSMENT — EXTERNAL EXAM - RIGHT EYE: OD_EXAM: NORMAL

## 2024-08-02 NOTE — PROGRESS NOTES
CC -   wet AMD OS monocular    INTERVAL HISTORY -  OS stable, no pain OD using PF 2/day OD     PMH - Janna Mejía is a  89 year old year-old patient with history of aqueous misdirection & endophthalmitis OD diagnosed 3/3/24, had high IOP, shallow chamber, and severe inflammation OD 3/3/24 after injection (?Eylea) OU by Dr. Espinoza 3/1/24.    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 Espinoza   - per patient was on 7 week interval   - transferred to [facility]   - changed to PRN     - last injection Eylea  3/1/24 per patient (5 months)   - OCT today no fluid, stable   - VA stable   - DFE no heme   - recheck 4-6 weeks (PRN)     - low vision appointment on 6/28/2024      # NLP painful eye, OD   - prior endophthalmitis   - now choroidals and RD   - Episodes of pain in her right eye     - reduced PF to 2/day and stopped jennyfer/atropine at  on 6/14/24   - seen by Chon 6/20/24 with plan made for evisceration - wishes to monitor given current comfort      #  h/o Endophthalmitis OD   - Dx 3/3/24 after injection (?Eylea) 3/1/24 by Alexis   - s/p PPV/ABx 3/3/24   - s/p ABx in clinic 3/5/24   - s/p PPV/ABx 3/7/24      # RD OD   - suspicious on U/S 3/19/24   - unclear signifcance given NLP vision    - uncertain vision potential, given prior  view  and limited RD would  have expected HM vision before   - worse today now funnel     -  Plan as above      #  H/o aqueous misdirection OD   - likely secondary to endophthalmitis   - s/p PPV 3/3/24   - IOP low today, chamber deep      # Wet AMD OD   - last injection 3/1/24 Eylea per patient   - monitor for now      # Syneresis OS   - advised S/Sx RD 5/2024      # Pseudophakia OU      Return in about 6 weeks (around 9/13/2024) for DFE OS, OCT OS, clarus OS.     Romulo Erickson MD  PGY-3, Ophthalmology      ATTESTATION     Attending Attestation:     Complete documentation of historical and exam elements from today's encounter can be found in the full encounter summary report (not reduplicated in this progress note).  I personally obtained the chief complaint(s) and history of present illness.  I confirmed and edited as necessary the review of systems, past medical/surgical history, family history, social history, and examination findings as documented by others; and I examined the patient myself.  I personally reviewed the relevant tests, images, and reports as documented above.  I personally reviewed the ophthalmic test(s) associated with this encounter, agree with the interpretation(s) as documented by the resident/fellow, and have edited the corresponding report(s) as necessary.   I formulated and edited as necessary the assessment and plan and discussed the findings and management plan with the patient and family    Hallie Isabel MD, PhD  , Vitreoretinal Surgery  Department of Ophthalmology  [facility]

## 2024-08-02 NOTE — NURSING NOTE
Chief Complaints and History of Present Illnesses   Patient presents with    Macular Degeneration Follow Up     6w follow up - Exudative age-related macular degeneration of left eye with active choroidal neovascularization     Chief Complaint(s) and History of Present Illness(es)       Macular Degeneration Follow Up              Comments: 6w follow up - Exudative age-related macular degeneration of left eye with active choroidal neovascularization              Comments    Pt stated vision is stable, no changes. Pt notates that right eye pain has subsided but she does get a dull throbbing ache when straining to see with left eye. Pt has been having tearing and discharge / crusting. Denies any flashes or floaters.     Treatment:   Pred BID right eye  AREDS BID     Radha Ahumada 8:52 AM  August 2, 2024

## 2024-08-12 DIAGNOSIS — H44.001 RIGHT ENDOPHTHALMIA: ICD-10-CM

## 2024-08-12 RX ORDER — PREDNISOLONE ACETATE 10 MG/ML
1-2 SUSPENSION/ DROPS OPHTHALMIC 2 TIMES DAILY
Qty: 5 ML | Refills: 2 | Status: SHIPPED | OUTPATIENT
Start: 2024-08-12

## 2024-08-12 NOTE — TELEPHONE ENCOUNTER
Medication: PREDNISOLONE AC 1% OPHTH SUSP  5ML     Requested directions: INSTLIL 1 DROP INTO OPERATIVE EYE THREE TIMES DAILY PER PHYSICIAN INSTRUCTIONS  Current directions on the medication list: same    Last Written Prescription Date:  3-19-24  Last Fill Quantity: 5 ml,   # refills: 1    Last Office Visit: 8-2-24  Future Office visit: 9-13-24    Attending Provider: Maame  Last Clinic Note: # NLP painful eye, OD              - prior endophthalmitis              - now choroidals and RD              - Episodes of pain in her right eye                 - reduced PF to 2/day and stopped jennyfer/atropine at  on 6/14/24              - seen by Chon 6/20/24 with plan made for evisceration - wishes to monitor given current comfort    Routing refill request to provider for review/approval because:    Not on protocol  Requires provider review  Inconsistent dose/directions

## 2024-08-29 DIAGNOSIS — H35.3221 EXUDATIVE AGE-RELATED MACULAR DEGENERATION OF LEFT EYE WITH ACTIVE CHOROIDAL NEOVASCULARIZATION (H): Primary | ICD-10-CM

## 2024-09-09 ENCOUNTER — PRE VISIT (OUTPATIENT)
Dept: OPHTHALMOLOGY | Facility: CLINIC | Age: 89
End: 2024-09-09

## 2024-09-13 ENCOUNTER — OFFICE VISIT (OUTPATIENT)
Dept: OPHTHALMOLOGY | Facility: CLINIC | Age: 89
End: 2024-09-13
Attending: OPHTHALMOLOGY
Payer: MEDICARE

## 2024-09-13 DIAGNOSIS — H35.3221 EXUDATIVE AGE-RELATED MACULAR DEGENERATION OF LEFT EYE WITH ACTIVE CHOROIDAL NEOVASCULARIZATION (H): ICD-10-CM

## 2024-09-13 PROCEDURE — 99207 FUNDUS PHOTOS OS (LEFT EYE): CPT | Mod: 26 | Performed by: OPHTHALMOLOGY

## 2024-09-13 PROCEDURE — 92250 FUNDUS PHOTOGRAPHY W/I&R: CPT | Performed by: OPHTHALMOLOGY

## 2024-09-13 PROCEDURE — 99213 OFFICE O/P EST LOW 20 MIN: CPT | Performed by: OPHTHALMOLOGY

## 2024-09-13 PROCEDURE — 92134 CPTRZ OPH DX IMG PST SGM RTA: CPT | Performed by: OPHTHALMOLOGY

## 2024-09-13 PROCEDURE — G0463 HOSPITAL OUTPT CLINIC VISIT: HCPCS | Performed by: OPHTHALMOLOGY

## 2024-09-13 ASSESSMENT — CONF VISUAL FIELD
OS_INFERIOR_NASAL_RESTRICTION: 0
OS_NORMAL: 1
OS_SUPERIOR_TEMPORAL_RESTRICTION: 0
OD_INFERIOR_NASAL_RESTRICTION: 1
OS_SUPERIOR_NASAL_RESTRICTION: 0
OD_SUPERIOR_TEMPORAL_RESTRICTION: 1
OD_INFERIOR_TEMPORAL_RESTRICTION: 1
OD_SUPERIOR_NASAL_RESTRICTION: 1
OS_INFERIOR_TEMPORAL_RESTRICTION: 0

## 2024-09-13 ASSESSMENT — VISUAL ACUITY
OS_SC+: -1
OS_SC: 20/80
METHOD: SNELLEN - LINEAR
OS_PH_SC+: -2
OS_PH_SC: 20/50
OD_SC: NLP

## 2024-09-13 ASSESSMENT — CUP TO DISC RATIO: OS_RATIO: 0.2

## 2024-09-13 ASSESSMENT — TONOMETRY
IOP_METHOD: TONOPEN
OD_IOP_MMHG: 6
OS_IOP_MMHG: 13

## 2024-09-13 ASSESSMENT — EXTERNAL EXAM - LEFT EYE: OS_EXAM: NORMAL

## 2024-09-13 NOTE — PROGRESS NOTES
CC -   wet AMD OS monocular    INTERVAL HISTORY -  LV 8/2/24 no changes     OS stable, no pain OD using PF 2/day OD     PMH - Janna Mejía is a  89 year old year-old patient with history of aqueous misdirection & endophthalmitis OD diagnosed 3/3/24, had high IOP, shallow chamber, and severe inflammation OD 3/3/24 after injection (?Eylea) OU by Dr. Espinoza 3/1/24.    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 Espinoza   - per patient was on 7 week interval   - transferred to [facility]   - changed to PRN     - last injection Eylea  3/1/24 per patient (6 months)   - OCT today no fluid, stable   - VA stable   - DFE no heme   - observe today   - recheck 6 weeks (PRN)     - low vision appointment on 6/28/2024      # NLP painful eye, OD   - prior endophthalmitis   - now choroidals and RD   - Episodes of pain in her right eye     - reduced PF to 2/day and stopped jennyfer/atropine  6/14/24   - seen by Chon 6/20/24 with plan made for evisceration - wishes to monitor given current comfort     - reduce PF to 1/day 9/13/24    #  h/o Endophthalmitis OD   - Dx 3/3/24 after injection (?Eylea) 3/1/24 by Alexis   - s/p PPV/ABx 3/3/24   - s/p ABx in clinic 3/5/24   - s/p PPV/ABx 3/7/24      # RD OD   - suspicious on U/S 3/19/24   - unclear signifcance given NLP vision    - uncertain vision potential, given prior  view  and limited RD would   have expected HM vision before   - worse today now funnel     - Plan as above      #  H/o aqueous misdirection OD   - likely secondary to endophthalmitis   - s/p PPV 3/3/24   - IOP low today, chamber deep      # Wet AMD OD   - last injection 3/1/24 Eylea per patient   - monitor for now      # Syneresis OS   - advised S/Sx RD 5/2024      # Pseudophakia OU      Return in about 6 weeks (around 10/25/2024) for  OCT OS, Clarus OS, NO Dilation.       ATTESTATION     Attending Attestation:     Complete documentation of historical and exam elements from today's encounter can be found in the full encounter summary report (not reduplicated in this progress note).  I personally obtained the chief complaint(s) and history of present illness.  I confirmed and edited as necessary the review of systems, past medical/surgical history, family history, social history, and examination findings as documented by others; and I examined the patient myself.  I personally reviewed the relevant tests, images, and reports as documented above.  I formulated and edited as necessary the assessment and plan and discussed the findings and management plan with the patient and family    Hallie Isabel MD, PhD  , Vitreoretinal Surgery  Department of Ophthalmology  [facility]

## 2024-10-15 DIAGNOSIS — H35.3221 EXUDATIVE AGE-RELATED MACULAR DEGENERATION OF LEFT EYE WITH ACTIVE CHOROIDAL NEOVASCULARIZATION (H): Primary | ICD-10-CM

## 2024-10-25 ENCOUNTER — OFFICE VISIT (OUTPATIENT)
Dept: OPHTHALMOLOGY | Facility: CLINIC | Age: 89
End: 2024-10-25
Attending: OPHTHALMOLOGY
Payer: MEDICARE

## 2024-10-25 DIAGNOSIS — H35.3221 EXUDATIVE AGE-RELATED MACULAR DEGENERATION OF LEFT EYE WITH ACTIVE CHOROIDAL NEOVASCULARIZATION (H): ICD-10-CM

## 2024-10-25 PROCEDURE — 92250 FUNDUS PHOTOGRAPHY W/I&R: CPT | Performed by: OPHTHALMOLOGY

## 2024-10-25 PROCEDURE — 99213 OFFICE O/P EST LOW 20 MIN: CPT | Performed by: OPHTHALMOLOGY

## 2024-10-25 PROCEDURE — G0463 HOSPITAL OUTPT CLINIC VISIT: HCPCS | Performed by: OPHTHALMOLOGY

## 2024-10-25 PROCEDURE — 92134 CPTRZ OPH DX IMG PST SGM RTA: CPT | Performed by: OPHTHALMOLOGY

## 2024-10-25 PROCEDURE — 99207 FUNDUS PHOTOS OS (LEFT EYE): CPT | Mod: 26 | Performed by: OPHTHALMOLOGY

## 2024-10-25 ASSESSMENT — TONOMETRY
OS_IOP_MMHG: 07
OD_IOP_MMHG: 02
IOP_METHOD: ICARE

## 2024-10-25 ASSESSMENT — CONF VISUAL FIELD
OS_SUPERIOR_NASAL_RESTRICTION: 0
OD_SUPERIOR_TEMPORAL_RESTRICTION: 1
OS_NORMAL: 1
OD_INFERIOR_TEMPORAL_RESTRICTION: 1
METHOD: COUNTING FINGERS
OD_SUPERIOR_NASAL_RESTRICTION: 1
OS_INFERIOR_TEMPORAL_RESTRICTION: 0
OS_SUPERIOR_TEMPORAL_RESTRICTION: 0
OD_INFERIOR_NASAL_RESTRICTION: 1
OS_INFERIOR_NASAL_RESTRICTION: 0

## 2024-10-25 ASSESSMENT — VISUAL ACUITY
OD_SC: NLP
OS_SC+: -2
METHOD: SNELLEN - LINEAR
OS_SC: 20/80

## 2024-10-25 NOTE — PROGRESS NOTES
CC -   wet AMD OS monocular    INTERVAL HISTORY -  LV 9/2024 no changes     OS stable, no pain OD using PF 1/day OD     last DFE OS 9/2024    [facility] - Janna Mejía is a  89 year old year-old patient with history of aqueous misdirection & endophthalmitis OD diagnosed 3/3/24, had high IOP, shallow chamber, and severe inflammation OD 3/3/24 after injection (?Eylea) OU by Dr. Espinoza 3/1/24.    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 Espinoza   - per patient was on 7 week interval   - transferred to [facility]   - changed to PRN     - last injection Eylea  3/1/24 per patient (7 months)   - OCT today no fluid, stable   - VA stable   - DFE no heme   - observe today   - recheck 6 weeks (PRN)     - low vision appointment on 6/28/2024      # NLP painful eye, OD   - prior endophthalmitis   - now choroidals and RD   - Episodes of pain in her right eye     - reduced PF to 2/day and stopped jennyfer/atropine  6/14/24   - seen by Chon 6/20/24 with plan made for evisceration - wishes to monitor given current comfort     - reduced PF to 1/day 9/13/24   - stable today   - reduce further in future if stable    #  h/o Endophthalmitis OD   - Dx 3/3/24 after injection (?Eylea) 3/1/24 by Alexis   - s/p PPV/ABx 3/3/24   - s/p ABx in clinic 3/5/24   - s/p PPV/ABx 3/7/24      # RD OD   - suspicious on U/S 3/19/24   - unclear signifcance given NLP  vision    - uncertain vision potential, given prior  view  and limited RD would  have expected HM vision before   - worse today now funnel     - Plan as above      #  H/o aqueous misdirection OD   - likely secondary to endophthalmitis   - s/p PPV 3/3/24   - IOP low today, chamber deep      # Wet AMD OD   - last injection 3/1/24 Eylea per patient   - monitor for now      # Syneresis OS   - advised S/Sx RD 5/2024      # Pseudophakia OU      Return in about 6 weeks (around 12/6/2024) for NO Dilation, OCT OS, Clarus OS.       ATTESTATION     Attending Attestation:     Complete documentation of historical and exam elements from today's encounter can be found in the full encounter summary report (not reduplicated in this progress note).  I personally obtained the chief complaint(s) and history of present illness.  I confirmed and edited as necessary the review of systems, past medical/surgical history, family history, social history, and examination findings as documented by others; and I examined the patient myself.  I personally reviewed the relevant tests, images, and reports as documented above.  I formulated and edited as necessary the assessment and plan and discussed the findings and management plan with the patient and family    Hallie Isabel MD, PhD  , Vitreoretinal Surgery  Department of Ophthalmology  [facility]

## 2024-10-25 NOTE — NURSING NOTE
Chief Complaints and History of Present Illnesses   Patient presents with    Macular Degeneration Follow Up     6 week follow up both eyes     Chief Complaint(s) and History of Present Illness(es)       Macular Degeneration Follow Up              Comments: 6 week follow up both eyes              Comments    Pt states vision is about the same as last visit. No eye pain today. No new flashes or floaters. No new redness or dryness.  Pt notes RE tearing more frequently with more mattering throughout the day.    SUJIT Ortiz October 25, 2024 9:31 AM

## 2024-11-20 ENCOUNTER — MYC MEDICAL ADVICE (OUTPATIENT)
Dept: OPHTHALMOLOGY | Facility: CLINIC | Age: 89
End: 2024-11-20
Payer: MEDICARE

## 2024-12-02 DIAGNOSIS — H35.3221 EXUDATIVE AGE-RELATED MACULAR DEGENERATION OF LEFT EYE WITH ACTIVE CHOROIDAL NEOVASCULARIZATION (H): Primary | ICD-10-CM

## 2024-12-23 DIAGNOSIS — H35.3221 EXUDATIVE AGE-RELATED MACULAR DEGENERATION OF LEFT EYE WITH ACTIVE CHOROIDAL NEOVASCULARIZATION (H): Primary | ICD-10-CM

## 2025-01-22 DIAGNOSIS — H35.3221 EXUDATIVE AGE-RELATED MACULAR DEGENERATION OF LEFT EYE WITH ACTIVE CHOROIDAL NEOVASCULARIZATION (H): Primary | ICD-10-CM

## 2025-02-04 ENCOUNTER — OFFICE VISIT (OUTPATIENT)
Dept: OPHTHALMOLOGY | Facility: CLINIC | Age: OVER 89
End: 2025-02-04
Attending: OPHTHALMOLOGY
Payer: MEDICARE

## 2025-02-04 DIAGNOSIS — H35.3221 EXUDATIVE AGE-RELATED MACULAR DEGENERATION OF LEFT EYE WITH ACTIVE CHOROIDAL NEOVASCULARIZATION (H): ICD-10-CM

## 2025-02-04 PROCEDURE — 92250 FUNDUS PHOTOGRAPHY W/I&R: CPT | Performed by: OPHTHALMOLOGY

## 2025-02-04 PROCEDURE — 250N000011 HC RX IP 250 OP 636: Mod: JZ | Performed by: OPHTHALMOLOGY

## 2025-02-04 PROCEDURE — 92134 CPTRZ OPH DX IMG PST SGM RTA: CPT | Performed by: OPHTHALMOLOGY

## 2025-02-04 PROCEDURE — 67028 INJECTION EYE DRUG: CPT | Mod: LT | Performed by: OPHTHALMOLOGY

## 2025-02-04 RX ADMIN — AFLIBERCEPT 2 MG: 40 INJECTION, SOLUTION INTRAVITREAL at 10:27

## 2025-02-04 ASSESSMENT — VISUAL ACUITY
METHOD: SNELLEN - LINEAR
OD_SC: NLP
OS_SC: 20/300 ECC

## 2025-02-04 ASSESSMENT — TONOMETRY
OS_IOP_MMHG: 11
OD_IOP_MMHG: 5
IOP_METHOD: TONOPEN

## 2025-02-04 NOTE — NURSING NOTE
Chief Complaints and History of Present Illnesses   Patient presents with    Follow Up     Chief Complaint(s) and History of Present Illness(es)       Follow Up              Course: stable    Associated symptoms: Negative for eye pain, flashes and floaters    Treatments tried: eye drops              Comments    Pt reports her side vision on the left side is good but anything in the center of the left eye's vision is blacked out. She denies vision changes.     Ocular Meds:   Pred qday OD    Mikayla PISANO 9:03 AM February 4, 2025

## 2025-02-04 NOTE — PROGRESS NOTES
CC -   wet AMD OS monocular    INTERVAL HISTORY -  No change, LV 1/3/25  last DFE OS 9/13/25    PM - Janna Mejía is a  89 year old patient with history of aqueous misdirection & endophthalmitis OD diagnosed 3/3/24, had high IOP, shallow chamber, and severe inflammation OD 3/3/24 after injection (?Eylea) OU by Dr. Espinoza 3/1/24.     Vit Bx OD 3/3/24  Gram stain - 3+ g+ cocci,  3+ WBC  Aerobic Cx - 3+ strept oralis  Anaerobic Cx - NTD (3/11/24)  Fungal Cx - NTD (3/11/24)    Vit Bx OD 3/7/24  LEYDI - no organisms, cultures aerobic & anaerobic negative    PAST OCULAR SURGERY  CE/IOL OU ~ 2018  PPV/Vit Bx/ABx OD 3/3/24 ([facility])  PPV/vit Bx/ABx OD 3/7/24 (DDK)    RETINAL IMAGING:  U/S B-scan 03/25/24   OD - Total funnel ?partly closed at ON RD with large serous choroidals largest nasal/temporal    OCT macula 02/04/2025  OD - unable  OS - large GA, central FVPED, avitric      ASSESSMENT & PLAN    # Wet AMD OS   - previously injected with Eylea by Dr. Espinoza (before 3/2024)   - per patient was on 7 week interval   - transferred to [facility] (3/2024)   - changed to PRN 3/2024   - new recurrence with SRH and decr VA 12/6/24 after 9 monht Eylea   - heme resolved on Clarus photo 2/4/25       - last injection Eylea (#2 N series) 1/3/25 (4 weeks)   - OCT stable   - VA stable   - photo no heme    - inject Eylea today   - extend to 6 weeks   - recheck 6 weeks repeat inj T&E, near end-stage     - uncertain vision prognosis   - low vision appointment on 6/28/2024      # NLP painful eye, OD   - prior endophthalmitis   - now choroidals and RD   - Episodes of pain in her right eye     - reduced PF to 2/day and stopped jennyfer/atropine  6/14/24   - seen by Chon 6/20/24 with plan made for evisceration - wishes to monitor given current comfort     - reduced PF to 1/day 9/13/24   - stable today   - reduce further in future if stable    #  h/o Endophthalmitis OD   - Dx 3/3/24 after injection (?Eylea) 3/1/24 by Alexis   - s/p  PPV/ABx 3/3/24   - s/p ABx in clinic 3/5/24   - s/p PPV/ABx 3/7/24      # RD OD   - suspicious on U/S 3/19/24 unclear signifcance given NLP vision    - uncertain vision potential, given prior  view  and limited RD would  have expected HM vision before   - Plan as above      #  H/o aqueous misdirection OD   - likely secondary to endophthalmitis   - s/p PPV 3/3/24   - IOP low today, chamber deep      # Wet AMD OD   - last injection 3/1/24 Eylea per patient   - monitor for now      # Syneresis OS   - advised S/Sx RD 5/2024      # Pseudophakia OU      Return in about 6 weeks (around 3/18/2025) for DFE OS, OCT OS, Clarus OS, Injection OS, Eylea.     ATTESTATION     Attending Attestation:     Complete documentation of historical and exam elements from today's encounter can be found in the full encounter summary report (not reduplicated in this progress note).  I personally obtained the chief complaint(s) and history of present illness.  I confirmed and edited as necessary the review of systems, past medical/surgical history, family history, social history, and examination findings as documented by others; and I examined the patient myself.  I personally reviewed the relevant tests, images, and reports as documented above.  I formulated and edited as necessary the assessment and plan and discussed the findings and management plan with the patient and family    Hallie Isabel MD, PhD  , Vitreoretinal Surgery  Department of Ophthalmology  [facility]

## 2025-03-20 DIAGNOSIS — H35.3221 EXUDATIVE AGE-RELATED MACULAR DEGENERATION OF LEFT EYE WITH ACTIVE CHOROIDAL NEOVASCULARIZATION (H): Primary | ICD-10-CM

## 2025-04-01 ENCOUNTER — OFFICE VISIT (OUTPATIENT)
Dept: OPHTHALMOLOGY | Facility: CLINIC | Age: OVER 89
End: 2025-04-01
Attending: OPHTHALMOLOGY
Payer: MEDICARE

## 2025-04-01 DIAGNOSIS — H35.3221 EXUDATIVE AGE-RELATED MACULAR DEGENERATION OF LEFT EYE WITH ACTIVE CHOROIDAL NEOVASCULARIZATION (H): ICD-10-CM

## 2025-04-01 PROCEDURE — 67028 INJECTION EYE DRUG: CPT | Mod: LT | Performed by: OPHTHALMOLOGY

## 2025-04-01 PROCEDURE — 92134 CPTRZ OPH DX IMG PST SGM RTA: CPT | Performed by: OPHTHALMOLOGY

## 2025-04-01 PROCEDURE — G0463 HOSPITAL OUTPT CLINIC VISIT: HCPCS | Performed by: OPHTHALMOLOGY

## 2025-04-01 PROCEDURE — 250N000011 HC RX IP 250 OP 636: Mod: JZ | Performed by: OPHTHALMOLOGY

## 2025-04-01 PROCEDURE — 92250 FUNDUS PHOTOGRAPHY W/I&R: CPT | Performed by: OPHTHALMOLOGY

## 2025-04-01 RX ADMIN — AFLIBERCEPT 2 MG: 40 INJECTION, SOLUTION INTRAVITREAL at 10:14

## 2025-04-01 ASSESSMENT — CONF VISUAL FIELD
OD_SUPERIOR_TEMPORAL_RESTRICTION: 1
OS_SUPERIOR_TEMPORAL_RESTRICTION: 2
OS_INFERIOR_NASAL_RESTRICTION: 2
OD_INFERIOR_TEMPORAL_RESTRICTION: 1
OS_INFERIOR_TEMPORAL_RESTRICTION: 2
OD_INFERIOR_NASAL_RESTRICTION: 1
OS_SUPERIOR_NASAL_RESTRICTION: 2
OD_SUPERIOR_NASAL_RESTRICTION: 1

## 2025-04-01 ASSESSMENT — VISUAL ACUITY
METHOD: SNELLEN - LINEAR
OD_SC: NLP

## 2025-04-01 ASSESSMENT — CUP TO DISC RATIO: OS_RATIO: 0.2

## 2025-04-01 ASSESSMENT — TONOMETRY
OS_IOP_MMHG: 9
OD_IOP_MMHG: UNABLE
IOP_METHOD: TONOPEN

## 2025-04-01 ASSESSMENT — EXTERNAL EXAM - RIGHT EYE: OD_EXAM: NORMAL

## 2025-04-01 ASSESSMENT — EXTERNAL EXAM - LEFT EYE: OS_EXAM: NORMAL

## 2025-04-01 NOTE — NURSING NOTE
Chief Complaints and History of Present Illnesses   Patient presents with    Follow Up     Chief Complaint(s) and History of Present Illness(es)       Follow Up               Comments    Patient states that her vision is the same since she was here last. No pain and irritation. No flashes of light. No floaters.     Ocular Meds:  Prednisolone Daily in the [facility], April 1, 2025 9:06 AM

## 2025-04-01 NOTE — PROGRESS NOTES
CC -   wet AMD OS monocular    INTERVAL HISTORY -  No change, LV 2/4/25  last DFE OS 4/1/25    PM - Janna Mejía is a  89 year old patient with history of aqueous misdirection & endophthalmitis OD diagnosed 3/3/24, had high IOP, shallow chamber, and severe inflammation OD 3/3/24 after injection (?Eylea) OU by Dr. Espinoza 3/1/24.     Vit Bx OD 3/3/24  Gram stain - 3+ g+ cocci,  3+ WBC  Aerobic Cx - 3+ strept oralis  Anaerobic Cx - NTD (3/11/24)  Fungal Cx - NTD (3/11/24)    Vit Bx OD 3/7/24  LEYDI - no organisms, cultures aerobic & anaerobic negative    PAST OCULAR SURGERY  CE/IOL OU ~ 2018  PPV/Vit Bx/ABx OD 3/3/24 ([facility])  PPV/vit Bx/ABx OD 3/7/24 (DDK)    RETINAL IMAGING:  U/S B-scan 03/25/24   OD - Total funnel ?partly closed at ON RD with large serous choroidals largest nasal/temporal    OCT macula 04/01/2025  OD - unable  OS - large GA, central FVPED, avitric      ASSESSMENT & PLAN    # Wet AMD OS   - previously injected with Eylea by Dr. Espinoza (before 3/2024)   - per patient was on 7 week interval   - transferred to [facility] (3/2024)   - changed to PRN 3/2024   - new recurrence with SRH and decr VA 12/6/24 after 9 monht Eylea   - heme resolved on Clarus photo 2/4/25       - last injection Eylea (#3  N series) 2/4/25 (8 weeks, 6 week interval)   - OCT stable   - VA stable   - photo no heme    - inject Eylea today   - extend to 6 weeks   - recheck 6 weeks repeat inj T&E, near end-stage     - uncertain vision prognosis   - low vision appointment on 6/28/2024      # NLP painful eye, OD   - prior endophthalmitis   - now choroidals and RD   - Episodes of pain in her right eye     - reduced PF to 2/day and stopped jennyfer/atropine  6/14/24   - seen by Chon 6/20/24 with plan made for evisceration - wishes to monitor given current comfort     - reduced PF to 1/day 9/13/24   - stable today   - reduce PF to q48 today 4/1/25     - reduce further in future if stable    #  h/o Endophthalmitis OD   - Dx 3/3/24  after injection (?Eylea) 3/1/24 by Alexis   - s/p PPV/ABx 3/3/24   - s/p ABx in clinic 3/5/24   - s/p PPV/ABx 3/7/24      # RD OD   - suspicious on U/S 3/19/24 unclear signifcance given NLP vision    - uncertain vision potential, given prior  view  and limited RD would  have expected HM vision before   - Plan as above      #  H/o aqueous misdirection OD   - likely secondary to endophthalmitis   - s/p PPV 3/3/24   - IOP low today, chamber deep      # Wet AMD OD   - last injection 3/1/24 Eylea per patient   - monitor for now      # Syneresis OS   - advised S/Sx RD 5/2024      # Pseudophakia OU      Return in about 10 weeks (around 6/10/2025) for NO Dilation, OCT OU, Injection OS, Eylea.     ATTESTATION     Attending Attestation:     Complete documentation of historical and exam elements from today's encounter can be found in the full encounter summary report (not reduplicated in this progress note).  I personally obtained the chief complaint(s) and history of present illness.  I confirmed and edited as necessary the review of systems, past medical/surgical history, family history, social history, and examination findings as documented by others; and I examined the patient myself.  I personally reviewed the relevant tests, images, and reports as documented above.  I formulated and edited as necessary the assessment and plan and discussed the findings and management plan with the patient and family    Hallie Isabel MD, PhD  , Vitreoretinal Surgery  Department of Ophthalmology  [facility]

## 2025-05-29 DIAGNOSIS — H35.3221 EXUDATIVE AGE-RELATED MACULAR DEGENERATION OF LEFT EYE WITH ACTIVE CHOROIDAL NEOVASCULARIZATION (H): Primary | ICD-10-CM

## 2025-06-10 ENCOUNTER — OFFICE VISIT (OUTPATIENT)
Dept: OPHTHALMOLOGY | Facility: CLINIC | Age: OVER 89
End: 2025-06-10
Attending: OPHTHALMOLOGY
Payer: MEDICARE

## 2025-06-10 DIAGNOSIS — H35.3221 EXUDATIVE AGE-RELATED MACULAR DEGENERATION OF LEFT EYE WITH ACTIVE CHOROIDAL NEOVASCULARIZATION (H): Primary | ICD-10-CM

## 2025-06-10 PROCEDURE — 92134 CPTRZ OPH DX IMG PST SGM RTA: CPT | Performed by: OPHTHALMOLOGY

## 2025-06-10 PROCEDURE — 250N000011 HC RX IP 250 OP 636: Mod: JZ | Performed by: OPHTHALMOLOGY

## 2025-06-10 PROCEDURE — 67028 INJECTION EYE DRUG: CPT | Mod: LT | Performed by: OPHTHALMOLOGY

## 2025-06-10 RX ADMIN — AFLIBERCEPT 2 MG: 40 INJECTION, SOLUTION INTRAVITREAL at 09:20

## 2025-06-10 ASSESSMENT — VISUAL ACUITY
OD_SC: NLP
OS_SC: 20/300ECC
METHOD: SNELLEN - LINEAR

## 2025-06-10 ASSESSMENT — TONOMETRY
IOP_METHOD: ICARE
OD_IOP_MMHG: 2
OS_IOP_MMHG: 8

## 2025-06-10 NOTE — PROGRESS NOTES
CC -   wet AMD OS monocular    INTERVAL HISTORY -  No change, LV 4/1/25  last DFE OS 4/1/25    PM - Janna Mejía is a  89 year old patient with history of aqueous misdirection & endophthalmitis OD diagnosed 3/3/24, had high IOP, shallow chamber, and severe inflammation OD 3/3/24 after injection (?Eylea) OU by Dr. Espinoza 3/1/24.     Vit Bx OD 3/3/24  Gram stain - 3+ g+ cocci,  3+ WBC  Aerobic Cx - 3+ strept oralis  Anaerobic Cx - NTD (3/11/24)  Fungal Cx - NTD (3/11/24)    Vit Bx OD 3/7/24  LEYDI - no organisms, cultures aerobic & anaerobic negative    PAST OCULAR SURGERY  CE/IOL OU ~ 2018  PPV/Vit Bx/ABx OD 3/3/24 ([facility])  PPV/vit Bx/ABx OD 3/7/24 ([facility])    RETINAL IMAGING:  U/S B-scan 03/25/24   OD - Total funnel ?partly closed at ON RD with large serous choroidals largest nasal/temporal    OCT macula 06/10/2025  OD - unable  OS - large GA, central FVPED, avitric      ASSESSMENT & PLAN    # Wet AMD OS   - previously injected with Eylea by Dr. Espinoza (before 3/2024)   - per patient was on 7 week interval   - transferred to [facility] (3/2024)   - changed to PRN 3/2024   - new recurrence with SRH and decr VA 12/6/24 after 9 monht Eylea   - heme resolved on Clarus photo 2/4/25       - last injection Eylea (#4  N series) 4/1/25 (10 weeks, 10 week interval)   - OCT stable   - VA stable   - prior photo no heme    - inject Eylea today   - extend to 12 weeks   - recheck 12 weeks repeat inj T&E, near end-stage     - uncertain vision prognosis   - low vision appointment on 6/28/2024      # NLP painful eye, OD   - prior endophthalmitis   - now choroidals and RD   - Episodes of pain in her right eye     - reduced PF to 2/day and stopped jennyfer/atropine  6/14/24   - seen by Chon 6/20/24 with plan made for evisceration - wishes to monitor given current comfort     - reduced PF to 1/day 9/13/24   - stable today   - reduce PF to q48 today 4/1/25     - reduce further in future if stable    #  h/o Endophthalmitis OD   -  Dx 3/3/24 after injection (?Eylea) 3/1/24 by Alexis   - s/p PPV/ABx 3/3/24   - s/p ABx in clinic 3/5/24   - s/p PPV/ABx 3/7/24      # RD OD   - suspicious on U/S 3/19/24 unclear signifcance given NLP vision    - uncertain vision potential, given prior  view  and limited RD would  have expected HM vision before   - Plan as above      #  H/o aqueous misdirection OD   - likely secondary to endophthalmitis   - s/p PPV 3/3/24   - IOP low today, chamber deep      # Wet AMD OD   - last injection 3/1/24 Eylea per patient   - monitor for now      # Syneresis OS   - advised S/Sx RD 5/2024      # Pseudophakia OU      Return in about 12 weeks (around 9/2/2025) for NO Dilation, OCT OS, Optos Photo OS.     ATTESTATION     Attending Attestation:     Complete documentation of historical and exam elements from today's encounter can be found in the full encounter summary report (not reduplicated in this progress note).  I personally obtained the chief complaint(s) and history of present illness.  I confirmed and edited as necessary the review of systems, past medical/surgical history, family history, social history, and examination findings as documented by others; and I examined the patient myself.  I personally reviewed the relevant tests, images, and reports as documented above.  I formulated and edited as necessary the assessment and plan and discussed the findings and management plan with the patient and family    Hallie Isabel MD, PhD  , Vitreoretinal Surgery  Department of Ophthalmology  [facility]

## 2025-06-10 NOTE — NURSING NOTE
Chief Complaints and History of Present Illnesses   Patient presents with    Exudative Macular Degeneration Follow Up     Exudative age-related macular degeneration of left eye with active choroidal neovascularization     Chief Complaint(s) and History of Present Illness(es)       Exudative Macular Degeneration Follow Up              Laterality: left eye    Associated symptoms: photophobia.  Negative for eye pain, flashes and floaters    Pain scale: 0/10    Comments: Exudative age-related macular degeneration of left eye with active choroidal neovascularization              Comments    Pt states vision is the same.  No pain.  No flashes/floaters.  Some light sensitivity.  Pt is compliant with RE drops.  No ocular meds LEMICHAELLE Patiño Aneta 10, 2025 8:52 AM

## 2025-07-06 ENCOUNTER — HEALTH MAINTENANCE LETTER (OUTPATIENT)
Age: OVER 89
End: 2025-07-06

## 2025-08-21 DIAGNOSIS — H35.3221 EXUDATIVE AGE-RELATED MACULAR DEGENERATION OF LEFT EYE WITH ACTIVE CHOROIDAL NEOVASCULARIZATION (H): Primary | ICD-10-CM

## 2025-09-02 ENCOUNTER — OFFICE VISIT (OUTPATIENT)
Dept: OPHTHALMOLOGY | Facility: CLINIC | Age: OVER 89
End: 2025-09-02
Attending: OPHTHALMOLOGY
Payer: MEDICARE

## 2025-09-02 DIAGNOSIS — H35.3221 EXUDATIVE AGE-RELATED MACULAR DEGENERATION OF LEFT EYE WITH ACTIVE CHOROIDAL NEOVASCULARIZATION (H): ICD-10-CM

## 2025-09-02 PROCEDURE — 67028 INJECTION EYE DRUG: CPT | Mod: LT | Performed by: OPHTHALMOLOGY

## 2025-09-02 PROCEDURE — 92250 FUNDUS PHOTOGRAPHY W/I&R: CPT | Mod: 26 | Performed by: OPHTHALMOLOGY

## 2025-09-02 PROCEDURE — 99207 OCT RETINA SPECTRALIS OU (BOTH EYE): CPT | Performed by: OPHTHALMOLOGY

## 2025-09-02 PROCEDURE — G0463 HOSPITAL OUTPT CLINIC VISIT: HCPCS | Mod: 25 | Performed by: OPHTHALMOLOGY

## 2025-09-02 PROCEDURE — G0463 HOSPITAL OUTPT CLINIC VISIT: HCPCS | Performed by: OPHTHALMOLOGY

## 2025-09-02 PROCEDURE — 92250 FUNDUS PHOTOGRAPHY W/I&R: CPT | Performed by: OPHTHALMOLOGY

## 2025-09-02 PROCEDURE — 92134 CPTRZ OPH DX IMG PST SGM RTA: CPT | Performed by: OPHTHALMOLOGY

## 2025-09-02 ASSESSMENT — TONOMETRY
OD_IOP_MMHG: 02
IOP_METHOD: TONOPEN
OS_IOP_MMHG: 07

## 2025-09-02 ASSESSMENT — CONF VISUAL FIELD
OD_SUPERIOR_TEMPORAL_RESTRICTION: 1
OD_INFERIOR_TEMPORAL_RESTRICTION: 1
OS_SUPERIOR_NASAL_RESTRICTION: 2
OD_SUPERIOR_NASAL_RESTRICTION: 1
OD_INFERIOR_NASAL_RESTRICTION: 1
OS_SUPERIOR_TEMPORAL_RESTRICTION: 2
OS_INFERIOR_TEMPORAL_RESTRICTION: 2
OS_INFERIOR_NASAL_RESTRICTION: 2

## 2025-09-02 ASSESSMENT — VISUAL ACUITY
OS_PH_SC: 20/150
OS_SC: 20/200
METHOD: SNELLEN - LINEAR
OD_SC: NLP
OS_PH_SC+: +1

## (undated) DEVICE — PACK VITRECTOMY/RET CUSTOM ASC PQ15VRU12

## (undated) DEVICE — EYE VALVED ENTRY SYSTEM 25GA 6MM 8065751782

## (undated) DEVICE — POSITIONER ARMBOARD FOAM 1PAIR LF FP-ARMB1

## (undated) DEVICE — EYE COVER TONOPEN OCU FILM LATEX 230651-002

## (undated) DEVICE — SU PLAIN 6-0 G-1DA 18" 770G

## (undated) DEVICE — EYE FORCEP TIP MAX GRIP ADV DISP 25GA 725.13

## (undated) DEVICE — EYE CANN IRR 30GA  ANTERIOR CHAMBER 581273

## (undated) DEVICE — EYE DRAPE MICROSCOPE UNIVERSAL (BIOM FULL) 08-MK55140

## (undated) DEVICE — LINEN TOWEL PACK X5 5464

## (undated) DEVICE — EYE KNIFE STILETTO VISITEC 1.1MM ANG 45DEG SIDEPORT 376620

## (undated) DEVICE — EYE CANN SOFT TIP 25GA FOR VALVED SET 8065149530

## (undated) DEVICE — EYE CANN IRR 20GA ACM LEWICKY 585061

## (undated) DEVICE — STRAP KNEE/BODY 31143004

## (undated) DEVICE — SYR 05ML LL W/O NDL

## (undated) DEVICE — EYE PREP BETADINE 5% SOLUTION 30ML 0065-0411-30

## (undated) DEVICE — OPHTHALMIC DRAPE WITH POUCH

## (undated) DEVICE — EYE CANN IRR 27GA ANTERIOR CHAMBER 581280

## (undated) DEVICE — EYE PACK 25GA CONSTELLATION 10,000 CPM PPK9380-04

## (undated) DEVICE — APPLICATORS COTTON-TIPPED 3" PKG OF 2 C15050-003

## (undated) DEVICE — TAPE TRANSPORE 1"X1.5YD 1534S-1

## (undated) DEVICE — SYR 01ML LL W/O NDL LATEX FREE 309628

## (undated) DEVICE — DRSG EYE PAD STERILE 1.63X2.63" NON21600

## (undated) DEVICE — NDL 18GA 1.5" 305196

## (undated) DEVICE — TUBING IV SOLUTION SET MALE LL ADAPTER 125" 1C8296

## (undated) DEVICE — EYE LENS BNDG CONTACT ACUVUE OASYS W/HYDRACLEAR PLUS 14.0D P

## (undated) DEVICE — EYE SOL BSS 500ML BAG 0065-1795-04

## (undated) DEVICE — EYE NDL RETROBULBAR ATKINSON 25GA 1.5" 581637

## (undated) DEVICE — GLOVE BIOGEL PI MICRO SZ 7.5 48575

## (undated) RX ORDER — FENTANYL CITRATE-0.9 % NACL/PF 10 MCG/ML
PLASTIC BAG, INJECTION (ML) INTRAVENOUS
Status: DISPENSED
Start: 2024-03-03

## (undated) RX ORDER — ACETAMINOPHEN 325 MG/1
TABLET ORAL
Status: DISPENSED
Start: 2024-03-03

## (undated) RX ORDER — EPHEDRINE SULFATE 50 MG/ML
INJECTION, SOLUTION INTRAMUSCULAR; INTRAVENOUS; SUBCUTANEOUS
Status: DISPENSED
Start: 2024-03-03

## (undated) RX ORDER — FENTANYL CITRATE 50 UG/ML
INJECTION, SOLUTION INTRAMUSCULAR; INTRAVENOUS
Status: DISPENSED
Start: 2024-03-03

## (undated) RX ORDER — PROPOFOL 10 MG/ML
INJECTION, EMULSION INTRAVENOUS
Status: DISPENSED
Start: 2024-03-03